# Patient Record
Sex: MALE | Race: WHITE | NOT HISPANIC OR LATINO | Employment: OTHER | URBAN - METROPOLITAN AREA
[De-identification: names, ages, dates, MRNs, and addresses within clinical notes are randomized per-mention and may not be internally consistent; named-entity substitution may affect disease eponyms.]

---

## 2018-01-01 ENCOUNTER — HOSPITAL ENCOUNTER (INPATIENT)
Facility: HOSPITAL | Age: 68
LOS: 1 days | DRG: 951 | End: 2018-12-12
Attending: FAMILY MEDICINE | Admitting: FAMILY MEDICINE
Payer: OTHER MISCELLANEOUS

## 2018-01-01 ENCOUNTER — ANESTHESIA EVENT (OUTPATIENT)
Dept: INTENSIVE CARE | Facility: HOSPITAL | Age: 68
DRG: 871 | End: 2018-01-01
Payer: MEDICARE

## 2018-01-01 ENCOUNTER — HOSPITAL ENCOUNTER (OUTPATIENT)
Dept: RADIOLOGY | Facility: OTHER | Age: 68
Discharge: HOME | End: 2018-07-12
Attending: INTERNAL MEDICINE
Payer: MEDICARE

## 2018-01-01 ENCOUNTER — HOSPITAL ENCOUNTER (EMERGENCY)
Facility: HOSPITAL | Age: 68
Discharge: HOME | End: 2018-12-01
Attending: EMERGENCY MEDICINE
Payer: MEDICARE

## 2018-01-01 ENCOUNTER — ANESTHESIA (OUTPATIENT)
Dept: INTENSIVE CARE | Facility: HOSPITAL | Age: 68
DRG: 871 | End: 2018-01-01
Payer: MEDICARE

## 2018-01-01 ENCOUNTER — HOSPITAL ENCOUNTER (INPATIENT)
Facility: HOSPITAL | Age: 68
LOS: 4 days | Discharge: HOSPICE | DRG: 871 | End: 2018-12-12
Attending: EMERGENCY MEDICINE | Admitting: FAMILY MEDICINE
Payer: MEDICARE

## 2018-01-01 VITALS
SYSTOLIC BLOOD PRESSURE: 62 MMHG | RESPIRATION RATE: 20 BRPM | OXYGEN SATURATION: 98 % | HEIGHT: 70 IN | DIASTOLIC BLOOD PRESSURE: 46 MMHG | SYSTOLIC BLOOD PRESSURE: 121 MMHG | OXYGEN SATURATION: 95 % | HEART RATE: 116 BPM | TEMPERATURE: 98 F | WEIGHT: 165 LBS | DIASTOLIC BLOOD PRESSURE: 82 MMHG | BODY MASS INDEX: 23.62 KG/M2

## 2018-01-01 VITALS
WEIGHT: 182.13 LBS | HEIGHT: 70 IN | TEMPERATURE: 103 F | RESPIRATION RATE: 22 BRPM | DIASTOLIC BLOOD PRESSURE: 29 MMHG | SYSTOLIC BLOOD PRESSURE: 65 MMHG | HEART RATE: 132 BPM | BODY MASS INDEX: 26.07 KG/M2 | OXYGEN SATURATION: 92 %

## 2018-01-01 DIAGNOSIS — R65.10 SIRS (SYSTEMIC INFLAMMATORY RESPONSE SYNDROME): ICD-10-CM

## 2018-01-01 DIAGNOSIS — M89.49 OSTEOARTHROSIS MULTIPLE SITES, NOT SPECIFIED AS GENERALIZED: ICD-10-CM

## 2018-01-01 DIAGNOSIS — I48.91 ATRIAL FIBRILLATION WITH RVR: ICD-10-CM

## 2018-01-01 DIAGNOSIS — I48.91 AFIB: ICD-10-CM

## 2018-01-01 DIAGNOSIS — R65.21 SEPTIC SHOCK: ICD-10-CM

## 2018-01-01 DIAGNOSIS — J96.01 ACUTE RESPIRATORY FAILURE WITH HYPOXIA AND HYPERCAPNIA: ICD-10-CM

## 2018-01-01 DIAGNOSIS — B17.10 ACUTE HEPATITIS C: ICD-10-CM

## 2018-01-01 DIAGNOSIS — K21.9 ESOPHAGEAL REFLUX: ICD-10-CM

## 2018-01-01 DIAGNOSIS — M54.50 LOW BACK PAIN: ICD-10-CM

## 2018-01-01 DIAGNOSIS — Z51.5 PALLIATIVE CARE ENCOUNTER: ICD-10-CM

## 2018-01-01 DIAGNOSIS — M54.31 BILATERAL SCIATICA: Primary | ICD-10-CM

## 2018-01-01 DIAGNOSIS — K21.9 ESOPHAGEAL REFLUX: Primary | ICD-10-CM

## 2018-01-01 DIAGNOSIS — M54.32 BILATERAL SCIATICA: Primary | ICD-10-CM

## 2018-01-01 DIAGNOSIS — R18.8 OTHER ASCITES: ICD-10-CM

## 2018-01-01 DIAGNOSIS — R00.0 TACHYCARDIA: ICD-10-CM

## 2018-01-01 DIAGNOSIS — R16.0 HEPATOMEGALY: ICD-10-CM

## 2018-01-01 DIAGNOSIS — J96.02 ACUTE RESPIRATORY FAILURE WITH HYPOXIA AND HYPERCAPNIA: ICD-10-CM

## 2018-01-01 DIAGNOSIS — A41.9 SEPTIC SHOCK: ICD-10-CM

## 2018-01-01 DIAGNOSIS — I10 ESSENTIAL HYPERTENSION, BENIGN: ICD-10-CM

## 2018-01-01 DIAGNOSIS — Z71.89 GOALS OF CARE, COUNSELING/DISCUSSION: ICD-10-CM

## 2018-01-01 DIAGNOSIS — J90 PLEURAL EFFUSION: ICD-10-CM

## 2018-01-01 DIAGNOSIS — R06.02 SHORTNESS OF BREATH: Primary | ICD-10-CM

## 2018-01-01 LAB
ALBUMIN FLD-MCNC: 0.5 G/DL
ALBUMIN SERPL BCP-MCNC: 1.2 G/DL
ALBUMIN SERPL BCP-MCNC: 1.4 G/DL
ALBUMIN SERPL BCP-MCNC: 1.4 G/DL
ALBUMIN SERPL BCP-MCNC: 1.5 G/DL
ALBUMIN SERPL BCP-MCNC: 1.7 G/DL
ALBUMIN SERPL BCP-MCNC: 1.8 G/DL
ALBUMIN SERPL BCP-MCNC: 1.9 G/DL
ALBUMIN SERPL BCP-MCNC: 2 G/DL
ALBUMIN SERPL BCP-MCNC: 2.2 G/DL
ALBUMIN SERPL BCP-MCNC: 2.2 G/DL
ALBUMIN SERPL BCP-MCNC: 2.3 G/DL
ALBUMIN SERPL BCP-MCNC: 2.4 G/DL
ALBUMIN SERPL BCP-MCNC: 2.5 G/DL
ALBUMIN SERPL BCP-MCNC: 2.5 G/DL
ALLENS TEST: ABNORMAL
ALP SERPL-CCNC: 121 U/L
ALP SERPL-CCNC: 128 U/L
ALP SERPL-CCNC: 132 U/L
ALP SERPL-CCNC: 141 U/L
ALP SERPL-CCNC: 153 U/L
ALP SERPL-CCNC: 172 U/L
ALT SERPL W/O P-5'-P-CCNC: 14 U/L
ALT SERPL W/O P-5'-P-CCNC: 16 U/L
ALT SERPL W/O P-5'-P-CCNC: 19 U/L
ALT SERPL W/O P-5'-P-CCNC: 19 U/L
ALT SERPL W/O P-5'-P-CCNC: 20 U/L
ALT SERPL W/O P-5'-P-CCNC: 26 U/L
AMPHET+METHAMPHET UR QL: NEGATIVE
AMYLASE, BODY FLUID: 21 U/L
ANION GAP SERPL CALC-SCNC: 10 MMOL/L
ANION GAP SERPL CALC-SCNC: 11 MMOL/L
ANION GAP SERPL CALC-SCNC: 11 MMOL/L
ANION GAP SERPL CALC-SCNC: 12 MMOL/L
ANION GAP SERPL CALC-SCNC: 12 MMOL/L
ANION GAP SERPL CALC-SCNC: 13 MMOL/L
ANION GAP SERPL CALC-SCNC: 16 MMOL/L
ANION GAP SERPL CALC-SCNC: 16 MMOL/L
ANION GAP SERPL CALC-SCNC: 18 MMOL/L
ANION GAP SERPL CALC-SCNC: 8 MMOL/L
ANION GAP SERPL CALC-SCNC: 9 MMOL/L
AORTIC ROOT ANNULUS: 3.54 CM
AORTIC VALVE CUSP SEPERATION: 1.25 CM
APTT BLDCRRT: 37.7 SEC
AST SERPL-CCNC: 52 U/L
AST SERPL-CCNC: 54 U/L
AST SERPL-CCNC: 59 U/L
AST SERPL-CCNC: 59 U/L
AST SERPL-CCNC: 62 U/L
AST SERPL-CCNC: 67 U/L
AV INDEX (PROSTH): 0.5
AV MEAN GRADIENT: 2.14 MMHG
AV PEAK GRADIENT: 4.58 MMHG
BACTERIA #/AREA URNS HPF: ABNORMAL /HPF
BACTERIA BLD CULT: NORMAL
BACTERIA UR CULT: NORMAL
BARBITURATES UR QL SCN>200 NG/ML: NEGATIVE
BASOPHILS # BLD AUTO: 0 K/UL
BASOPHILS # BLD AUTO: 0.01 K/UL
BASOPHILS # BLD AUTO: ABNORMAL K/UL
BASOPHILS NFR BLD: 0 %
BASOPHILS NFR BLD: 0.1 %
BASOPHILS NFR BLD: 1 %
BENZODIAZ UR QL SCN>200 NG/ML: NEGATIVE
BILIRUB DIRECT SERPL-MCNC: 1.2 MG/DL
BILIRUB DIRECT SERPL-MCNC: 1.3 MG/DL
BILIRUB SERPL-MCNC: 1 MG/DL
BILIRUB SERPL-MCNC: 1.1 MG/DL
BILIRUB SERPL-MCNC: 1.1 MG/DL
BILIRUB SERPL-MCNC: 1.6 MG/DL
BILIRUB UR QL STRIP: ABNORMAL
BNP SERPL-MCNC: 4248 PG/ML
BODY FLUID SOURCE AMYLASE: NORMAL
BODY FLUID SOURCE, LDH: NORMAL
BSA FOR ECHO PROCEDURE: 1.92 M2
BUN SERPL-MCNC: 11 MG/DL
BUN SERPL-MCNC: 11 MG/DL
BUN SERPL-MCNC: 13 MG/DL
BUN SERPL-MCNC: 13 MG/DL
BUN SERPL-MCNC: 14 MG/DL
BUN SERPL-MCNC: 4 MG/DL
BUN SERPL-MCNC: 4 MG/DL
BUN SERPL-MCNC: 5 MG/DL
BUN SERPL-MCNC: 6 MG/DL
BUN SERPL-MCNC: 7 MG/DL
BUN SERPL-MCNC: 9 MG/DL
BZE UR QL SCN: NEGATIVE
CALCIUM SERPL-MCNC: 6.5 MG/DL
CALCIUM SERPL-MCNC: 6.5 MG/DL
CALCIUM SERPL-MCNC: 6.7 MG/DL
CALCIUM SERPL-MCNC: 6.7 MG/DL
CALCIUM SERPL-MCNC: 6.8 MG/DL
CALCIUM SERPL-MCNC: 6.8 MG/DL
CALCIUM SERPL-MCNC: 6.9 MG/DL
CALCIUM SERPL-MCNC: 7.1 MG/DL
CALCIUM SERPL-MCNC: 7.3 MG/DL
CALCIUM SERPL-MCNC: 7.4 MG/DL
CALCIUM SERPL-MCNC: 7.4 MG/DL
CANNABINOIDS UR QL SCN: NEGATIVE
CHLORIDE SERPL-SCNC: 101 MMOL/L
CHLORIDE SERPL-SCNC: 101 MMOL/L
CHLORIDE SERPL-SCNC: 102 MMOL/L
CHLORIDE SERPL-SCNC: 103 MMOL/L
CHLORIDE SERPL-SCNC: 104 MMOL/L
CHLORIDE SERPL-SCNC: 99 MMOL/L
CHOLEST FLD-MCNC: 19 MG/DL
CK SERPL-CCNC: 90 U/L
CLARITY UR: CLEAR
CO2 SERPL-SCNC: 14 MMOL/L
CO2 SERPL-SCNC: 15 MMOL/L
CO2 SERPL-SCNC: 16 MMOL/L
CO2 SERPL-SCNC: 16 MMOL/L
CO2 SERPL-SCNC: 18 MMOL/L
CO2 SERPL-SCNC: 20 MMOL/L
CO2 SERPL-SCNC: 20 MMOL/L
CO2 SERPL-SCNC: 21 MMOL/L
CO2 SERPL-SCNC: 23 MMOL/L
COLOR UR: ABNORMAL
CREAT SERPL-MCNC: 0.6 MG/DL
CREAT SERPL-MCNC: 0.7 MG/DL
CREAT SERPL-MCNC: 0.8 MG/DL
CREAT SERPL-MCNC: 0.8 MG/DL
CREAT SERPL-MCNC: 1 MG/DL
CREAT SERPL-MCNC: 1 MG/DL
CREAT SERPL-MCNC: 1.2 MG/DL
CREAT UR-MCNC: 83 MG/DL
CV ECHO LV RWT: 0.25 CM
DELSYS: ABNORMAL
DIFFERENTIAL METHOD: ABNORMAL
DOP CALC AO PEAK VEL: 1.07 M/S
DOP CALC AO VTI: 15.01 CM
DOP CALCLVOT PEAK VEL VTI: 7.5 CM
ECHO LV POSTERIOR WALL: 0.58 CM (ref 0.6–1.1)
EOSINOPHIL # BLD AUTO: 0 K/UL
EOSINOPHIL # BLD AUTO: 0.1 K/UL
EOSINOPHIL # BLD AUTO: ABNORMAL K/UL
EOSINOPHIL NFR BLD: 0 %
EOSINOPHIL NFR BLD: 0.5 %
EP: 5
ERYTHROCYTE [DISTWIDTH] IN BLOOD BY AUTOMATED COUNT: 16.1 %
ERYTHROCYTE [DISTWIDTH] IN BLOOD BY AUTOMATED COUNT: 16.3 %
ERYTHROCYTE [DISTWIDTH] IN BLOOD BY AUTOMATED COUNT: 16.4 %
ERYTHROCYTE [DISTWIDTH] IN BLOOD BY AUTOMATED COUNT: 16.5 %
ERYTHROCYTE [DISTWIDTH] IN BLOOD BY AUTOMATED COUNT: 16.6 %
ERYTHROCYTE [SEDIMENTATION RATE] IN BLOOD BY WESTERGREN METHOD: 20 MM/H
EST. GFR  (AFRICAN AMERICAN): >60 ML/MIN/1.73 M^2
EST. GFR  (NON AFRICAN AMERICAN): >60 ML/MIN/1.73 M^2
ESTIMATED AVG GLUCOSE: 105 MG/DL
ETHANOL SERPL-MCNC: <10 MG/DL
FIO2: 0.55
FIO2: 0.7
FIO2: 100
FIO2: 30
FIO2: 40
FIO2: 40
FIO2: 60
FIO2: 70
FIO2: 75
FRACTIONAL SHORTENING: 14 % (ref 28–44)
GLUCOSE FLD-MCNC: 233 MG/DL
GLUCOSE SERPL-MCNC: 108 MG/DL
GLUCOSE SERPL-MCNC: 138 MG/DL
GLUCOSE SERPL-MCNC: 146 MG/DL
GLUCOSE SERPL-MCNC: 154 MG/DL
GLUCOSE SERPL-MCNC: 155 MG/DL
GLUCOSE SERPL-MCNC: 173 MG/DL
GLUCOSE SERPL-MCNC: 202 MG/DL
GLUCOSE SERPL-MCNC: 204 MG/DL
GLUCOSE SERPL-MCNC: 215 MG/DL
GLUCOSE SERPL-MCNC: 224 MG/DL
GLUCOSE SERPL-MCNC: 229 MG/DL
GLUCOSE SERPL-MCNC: 231 MG/DL
GLUCOSE SERPL-MCNC: 289 MG/DL
GLUCOSE UR QL STRIP: NEGATIVE
HAV IGM SERPL QL IA: NEGATIVE
HBA1C MFR BLD HPLC: 5.3 %
HBV CORE IGM SERPL QL IA: NEGATIVE
HBV SURFACE AG SERPL QL IA: NEGATIVE
HCO3 UR-SCNC: 14.4 MMOL/L (ref 24–28)
HCO3 UR-SCNC: 15 MMOL/L (ref 24–28)
HCO3 UR-SCNC: 15.2 MMOL/L (ref 24–28)
HCO3 UR-SCNC: 15.9 MMOL/L (ref 24–28)
HCO3 UR-SCNC: 16.4 MMOL/L (ref 24–28)
HCO3 UR-SCNC: 16.6 MMOL/L (ref 24–28)
HCO3 UR-SCNC: 17.2 MMOL/L (ref 24–28)
HCO3 UR-SCNC: 18.1 MMOL/L (ref 24–28)
HCO3 UR-SCNC: 19.2 MMOL/L (ref 24–28)
HCO3 UR-SCNC: 19.4 MMOL/L (ref 24–28)
HCO3 UR-SCNC: 19.5 MMOL/L (ref 24–28)
HCO3 UR-SCNC: 20.2 MMOL/L (ref 24–28)
HCO3 UR-SCNC: 20.4 MMOL/L (ref 24–28)
HCT VFR BLD AUTO: 29.9 %
HCT VFR BLD AUTO: 30.8 %
HCT VFR BLD AUTO: 31.5 %
HCT VFR BLD AUTO: 33.4 %
HCT VFR BLD AUTO: 36 %
HCV AB SERPL QL IA: POSITIVE
HGB BLD-MCNC: 10 G/DL
HGB BLD-MCNC: 10.4 G/DL
HGB BLD-MCNC: 10.9 G/DL
HGB BLD-MCNC: 11.1 G/DL
HGB BLD-MCNC: 11.7 G/DL
HGB UR QL STRIP: ABNORMAL
HYALINE CASTS #/AREA URNS LPF: 0 /LPF
INR PPP: 1.6
INR PPP: 1.9
INTERVENTRICULAR SEPTUM: 0.48 CM (ref 0.6–1.1)
IP: 10
KETONES UR QL STRIP: NEGATIVE
LA MAJOR: 6.49 CM
LA MINOR: 6.33 CM
LA WIDTH: 3.76 CM
LACTATE SERPL-SCNC: 1.7 MMOL/L
LACTATE SERPL-SCNC: 2.9 MMOL/L
LACTATE SERPL-SCNC: 3.3 MMOL/L
LACTATE SERPL-SCNC: 5.1 MMOL/L
LDH FLD L TO P-CCNC: 110 U/L
LDH SERPL L TO P-CCNC: 337 U/L
LEFT ATRIUM SIZE: 5.72 CM
LEFT ATRIUM VOLUME INDEX: 60.9 ML/M2
LEFT ATRIUM VOLUME: 117.16 CM3
LEFT INTERNAL DIMENSION IN SYSTOLE: 3.94 CM (ref 2.1–4)
LEFT VENTRICLE DIASTOLIC VOLUME INDEX: 49.81 ML/M2
LEFT VENTRICLE DIASTOLIC VOLUME: 95.78 ML
LEFT VENTRICLE MASS INDEX: 36.2 G/M2
LEFT VENTRICLE SYSTOLIC VOLUME INDEX: 35.2 ML/M2
LEFT VENTRICLE SYSTOLIC VOLUME: 67.63 ML
LEFT VENTRICULAR INTERNAL DIMENSION IN DIASTOLE: 4.57 CM (ref 3.5–6)
LEFT VENTRICULAR MASS: 69.66 G
LEUKOCYTE ESTERASE UR QL STRIP: ABNORMAL
LIPASE SERPL-CCNC: 11 U/L
LYMPHOCYTES # BLD AUTO: 0.5 K/UL
LYMPHOCYTES # BLD AUTO: 0.7 K/UL
LYMPHOCYTES # BLD AUTO: 0.8 K/UL
LYMPHOCYTES # BLD AUTO: 1 K/UL
LYMPHOCYTES # BLD AUTO: ABNORMAL K/UL
LYMPHOCYTES NFR BLD: 28 %
LYMPHOCYTES NFR BLD: 3.6 %
LYMPHOCYTES NFR BLD: 5.9 %
LYMPHOCYTES NFR BLD: 7.2 %
LYMPHOCYTES NFR BLD: 9.5 %
MAGNESIUM SERPL-MCNC: 0.7 MG/DL
MAGNESIUM SERPL-MCNC: 0.9 MG/DL
MAGNESIUM SERPL-MCNC: 1.5 MG/DL
MAGNESIUM SERPL-MCNC: 1.6 MG/DL
MAGNESIUM SERPL-MCNC: 1.8 MG/DL
MAGNESIUM SERPL-MCNC: 1.8 MG/DL
MAGNESIUM SERPL-MCNC: 2 MG/DL
MAGNESIUM SERPL-MCNC: 2.2 MG/DL
MAGNESIUM SERPL-MCNC: 2.9 MG/DL
MCH RBC QN AUTO: 34.7 PG
MCH RBC QN AUTO: 34.8 PG
MCH RBC QN AUTO: 35.1 PG
MCH RBC QN AUTO: 35.1 PG
MCH RBC QN AUTO: 35.9 PG
MCHC RBC AUTO-ENTMCNC: 32.5 G/DL
MCHC RBC AUTO-ENTMCNC: 32.6 G/DL
MCHC RBC AUTO-ENTMCNC: 33.4 G/DL
MCHC RBC AUTO-ENTMCNC: 33.8 G/DL
MCHC RBC AUTO-ENTMCNC: 35.2 G/DL
MCV RBC AUTO: 102 FL
MCV RBC AUTO: 103 FL
MCV RBC AUTO: 105 FL
MCV RBC AUTO: 106 FL
MCV RBC AUTO: 108 FL
METHADONE UR QL SCN>300 NG/ML: NEGATIVE
MICROSCOPIC COMMENT: ABNORMAL
MIN VOL: 10.2
MIN VOL: 19.5
MODE: ABNORMAL
MONOCYTES # BLD AUTO: 0.2 K/UL
MONOCYTES # BLD AUTO: 0.3 K/UL
MONOCYTES # BLD AUTO: 0.5 K/UL
MONOCYTES # BLD AUTO: 0.8 K/UL
MONOCYTES # BLD AUTO: ABNORMAL K/UL
MONOCYTES NFR BLD: 1 %
MONOCYTES NFR BLD: 2.2 %
MONOCYTES NFR BLD: 2.6 %
MONOCYTES NFR BLD: 4.5 %
MONOCYTES NFR BLD: 5.7 %
NEUTROPHILS # BLD AUTO: 10.1 K/UL
NEUTROPHILS # BLD AUTO: 12.6 K/UL
NEUTROPHILS # BLD AUTO: 8.5 K/UL
NEUTROPHILS # BLD AUTO: 9.8 K/UL
NEUTROPHILS NFR BLD: 59 %
NEUTROPHILS NFR BLD: 85.1 %
NEUTROPHILS NFR BLD: 89.6 %
NEUTROPHILS NFR BLD: 90.5 %
NEUTROPHILS NFR BLD: 91.5 %
NEUTS BAND NFR BLD MANUAL: 11 %
NITRITE UR QL STRIP: NEGATIVE
OPIATES UR QL SCN: NEGATIVE
PCO2 BLDA: 23.6 MMHG (ref 35–45)
PCO2 BLDA: 24.2 MMHG (ref 35–45)
PCO2 BLDA: 26.9 MMHG (ref 35–45)
PCO2 BLDA: 29 MMHG (ref 35–45)
PCO2 BLDA: 29.5 MMHG (ref 35–45)
PCO2 BLDA: 29.9 MMHG (ref 35–45)
PCO2 BLDA: 31.6 MMHG (ref 35–45)
PCO2 BLDA: 32.8 MMHG (ref 35–45)
PCO2 BLDA: 33.9 MMHG (ref 35–45)
PCO2 BLDA: 34 MMHG (ref 35–45)
PCO2 BLDA: 35.8 MMHG (ref 35–45)
PCO2 BLDA: 36 MMHG (ref 35–45)
PCO2 BLDA: 36.4 MMHG (ref 35–45)
PCO2 BLDA: 38 MMHG (ref 35–45)
PCO2 BLDA: 38.6 MMHG (ref 35–45)
PCP UR QL SCN>25 NG/ML: NEGATIVE
PEEP: 5
PEEP: 5
PEEP: 8
PH SMN: 7.25 [PH] (ref 7.35–7.45)
PH SMN: 7.27 [PH] (ref 7.35–7.45)
PH SMN: 7.29 [PH] (ref 7.35–7.45)
PH SMN: 7.3 [PH] (ref 7.35–7.45)
PH SMN: 7.32 [PH] (ref 7.35–7.45)
PH SMN: 7.34 [PH] (ref 7.35–7.45)
PH SMN: 7.35 [PH] (ref 7.35–7.45)
PH SMN: 7.35 [PH] (ref 7.35–7.45)
PH SMN: 7.36 [PH] (ref 7.35–7.45)
PH SMN: 7.37 [PH] (ref 7.35–7.45)
PH SMN: 7.38 [PH] (ref 7.35–7.45)
PH SMN: 7.39 [PH] (ref 7.35–7.45)
PH SMN: 7.4 [PH] (ref 7.35–7.45)
PH UR STRIP: 6 [PH] (ref 5–8)
PHOSPHATE SERPL-MCNC: 2 MG/DL
PHOSPHATE SERPL-MCNC: 2.1 MG/DL
PHOSPHATE SERPL-MCNC: 2.5 MG/DL
PHOSPHATE SERPL-MCNC: 3.2 MG/DL
PHOSPHATE SERPL-MCNC: 3.3 MG/DL
PHOSPHATE SERPL-MCNC: 3.4 MG/DL
PHOSPHATE SERPL-MCNC: 4 MG/DL
PHOSPHATE SERPL-MCNC: 4 MG/DL
PHOSPHATE SERPL-MCNC: 4.6 MG/DL
PHOSPHATE SERPL-MCNC: 5.9 MG/DL
PIP: 17
PIP: 25
PISA TR MAX VEL: 3.12 M/S
PLATELET # BLD AUTO: 103 K/UL
PLATELET # BLD AUTO: 153 K/UL
PLATELET # BLD AUTO: 214 K/UL
PLATELET # BLD AUTO: 61 K/UL
PLATELET # BLD AUTO: 82 K/UL
PLATELET BLD QL SMEAR: ABNORMAL
PMV BLD AUTO: 10.2 FL
PMV BLD AUTO: 10.2 FL
PMV BLD AUTO: 10.7 FL
PMV BLD AUTO: 11.8 FL
PMV BLD AUTO: 9.6 FL
PO2 BLDA: 111 MMHG (ref 80–100)
PO2 BLDA: 127 MMHG (ref 80–100)
PO2 BLDA: 129 MMHG (ref 80–100)
PO2 BLDA: 135 MMHG (ref 80–100)
PO2 BLDA: 142 MMHG (ref 80–100)
PO2 BLDA: 170 MMHG (ref 80–100)
PO2 BLDA: 26 MMHG (ref 80–100)
PO2 BLDA: 276 MMHG (ref 80–100)
PO2 BLDA: 36 MMHG (ref 40–60)
PO2 BLDA: 52 MMHG (ref 80–100)
PO2 BLDA: 58 MMHG (ref 80–100)
PO2 BLDA: 81 MMHG (ref 80–100)
PO2 BLDA: 82 MMHG (ref 80–100)
PO2 BLDA: 83 MMHG (ref 80–100)
PO2 BLDA: 85 MMHG (ref 80–100)
POC BE: -10 MMOL/L
POC BE: -11 MMOL/L
POC BE: -11 MMOL/L
POC BE: -5 MMOL/L
POC BE: -5 MMOL/L
POC BE: -6 MMOL/L
POC BE: -7 MMOL/L
POC BE: -7 MMOL/L
POC BE: -8 MMOL/L
POC BE: -8 MMOL/L
POC BE: -9 MMOL/L
POC BE: -9 MMOL/L
POC SATURATED O2: 100 % (ref 95–100)
POC SATURATED O2: 43 % (ref 95–100)
POC SATURATED O2: 68 % (ref 95–100)
POC SATURATED O2: 82 % (ref 95–100)
POC SATURATED O2: 86 % (ref 95–100)
POC SATURATED O2: 95 % (ref 95–100)
POC SATURATED O2: 96 % (ref 95–100)
POC SATURATED O2: 96 % (ref 95–100)
POC SATURATED O2: 97 % (ref 95–100)
POC SATURATED O2: 98 % (ref 95–100)
POC SATURATED O2: 99 % (ref 95–100)
POC TCO2: 15 MMOL/L (ref 23–27)
POC TCO2: 16 MMOL/L (ref 23–27)
POC TCO2: 16 MMOL/L (ref 23–27)
POC TCO2: 17 MMOL/L (ref 23–27)
POC TCO2: 18 MMOL/L (ref 23–27)
POC TCO2: 18 MMOL/L (ref 24–29)
POC TCO2: 19 MMOL/L (ref 23–27)
POC TCO2: 20 MMOL/L (ref 23–27)
POC TCO2: 20 MMOL/L (ref 23–27)
POC TCO2: 21 MMOL/L (ref 23–27)
POCT GLUCOSE: 105 MG/DL (ref 70–110)
POCT GLUCOSE: 125 MG/DL (ref 70–110)
POCT GLUCOSE: 142 MG/DL (ref 70–110)
POCT GLUCOSE: 152 MG/DL (ref 70–110)
POCT GLUCOSE: 163 MG/DL (ref 70–110)
POCT GLUCOSE: 170 MG/DL (ref 70–110)
POCT GLUCOSE: 176 MG/DL (ref 70–110)
POCT GLUCOSE: 180 MG/DL (ref 70–110)
POCT GLUCOSE: 182 MG/DL (ref 70–110)
POCT GLUCOSE: 190 MG/DL (ref 70–110)
POCT GLUCOSE: 239 MG/DL (ref 70–110)
POTASSIUM SERPL-SCNC: 3.3 MMOL/L
POTASSIUM SERPL-SCNC: 3.4 MMOL/L
POTASSIUM SERPL-SCNC: 3.4 MMOL/L
POTASSIUM SERPL-SCNC: 3.5 MMOL/L
POTASSIUM SERPL-SCNC: 3.7 MMOL/L
POTASSIUM SERPL-SCNC: 3.7 MMOL/L
POTASSIUM SERPL-SCNC: 4 MMOL/L
POTASSIUM SERPL-SCNC: 4.1 MMOL/L
POTASSIUM SERPL-SCNC: 4.1 MMOL/L
POTASSIUM SERPL-SCNC: 4.2 MMOL/L
POTASSIUM SERPL-SCNC: 4.8 MMOL/L
PROCALCITONIN SERPL IA-MCNC: 3.13 NG/ML
PROT FLD-MCNC: 1.2 G/DL
PROT SERPL-MCNC: 5.1 G/DL
PROT SERPL-MCNC: 5.1 G/DL
PROT SERPL-MCNC: 5.2 G/DL
PROT SERPL-MCNC: 5.3 G/DL
PROT SERPL-MCNC: 5.4 G/DL
PROT SERPL-MCNC: 6.1 G/DL
PROT UR QL STRIP: ABNORMAL
PROTHROMBIN TIME: 16.9 SEC
PROTHROMBIN TIME: 19.6 SEC
PV PEAK VELOCITY: 0.74 CM/S
RA MAJOR: 6.34 CM
RA PRESSURE: 3 MMHG
RBC # BLD AUTO: 2.85 M/UL
RBC # BLD AUTO: 2.99 M/UL
RBC # BLD AUTO: 3.09 M/UL
RBC # BLD AUTO: 3.14 M/UL
RBC # BLD AUTO: 3.33 M/UL
RBC #/AREA URNS HPF: 50 /HPF (ref 0–4)
RIGHT VENTRICULAR END-DIASTOLIC DIMENSION: 3.21 CM
SAMPLE: ABNORMAL
SITE: ABNORMAL
SODIUM SERPL-SCNC: 126 MMOL/L
SODIUM SERPL-SCNC: 129 MMOL/L
SODIUM SERPL-SCNC: 130 MMOL/L
SODIUM SERPL-SCNC: 131 MMOL/L
SODIUM SERPL-SCNC: 131 MMOL/L
SODIUM SERPL-SCNC: 132 MMOL/L
SODIUM SERPL-SCNC: 132 MMOL/L
SODIUM SERPL-SCNC: 133 MMOL/L
SODIUM SERPL-SCNC: 133 MMOL/L
SODIUM SERPL-SCNC: 134 MMOL/L
SODIUM SERPL-SCNC: 135 MMOL/L
SODIUM SERPL-SCNC: 135 MMOL/L
SODIUM SERPL-SCNC: 136 MMOL/L
SP GR UR STRIP: 1.01 (ref 1–1.03)
SP02: 100
SPECIMEN SOURCE: NORMAL
TOXICOLOGY INFORMATION: NORMAL
TR MAX PG: 38.94 MMHG
TROPONIN I SERPL DL<=0.01 NG/ML-MCNC: 0.29 NG/ML
TROPONIN I SERPL DL<=0.01 NG/ML-MCNC: 0.43 NG/ML
TROPONIN I SERPL DL<=0.01 NG/ML-MCNC: 0.53 NG/ML
TROPONIN I SERPL DL<=0.01 NG/ML-MCNC: 0.58 NG/ML
TV REST PULMONARY ARTERY PRESSURE: 41.94 MMHG
URN SPEC COLLECT METH UR: ABNORMAL
UROBILINOGEN UR STRIP-ACNC: NEGATIVE EU/DL
VT: 40
VT: 400
WBC # BLD AUTO: 10.95 K/UL
WBC # BLD AUTO: 11.05 K/UL
WBC # BLD AUTO: 13.95 K/UL
WBC # BLD AUTO: 7.18 K/UL
WBC # BLD AUTO: 9.95 K/UL
WBC #/AREA URNS HPF: >100 /HPF (ref 0–5)

## 2018-01-01 PROCEDURE — 27000221 HC OXYGEN, UP TO 24 HOURS

## 2018-01-01 PROCEDURE — 84157 ASSAY OF PROTEIN OTHER: CPT

## 2018-01-01 PROCEDURE — 85007 BL SMEAR W/DIFF WBC COUNT: CPT

## 2018-01-01 PROCEDURE — 80069 RENAL FUNCTION PANEL: CPT | Mod: 91

## 2018-01-01 PROCEDURE — 99291 PR CRITICAL CARE, E/M 30-74 MINUTES: ICD-10-PCS | Mod: ,,, | Performed by: INTERNAL MEDICINE

## 2018-01-01 PROCEDURE — 84100 ASSAY OF PHOSPHORUS: CPT

## 2018-01-01 PROCEDURE — 80048 BASIC METABOLIC PNL TOTAL CA: CPT

## 2018-01-01 PROCEDURE — 36600 WITHDRAWAL OF ARTERIAL BLOOD: CPT

## 2018-01-01 PROCEDURE — 82803 BLOOD GASES ANY COMBINATION: CPT

## 2018-01-01 PROCEDURE — 25000003 PHARM REV CODE 250: Performed by: INTERNAL MEDICINE

## 2018-01-01 PROCEDURE — 25000003 PHARM REV CODE 250: Performed by: EMERGENCY MEDICINE

## 2018-01-01 PROCEDURE — 63600175 PHARM REV CODE 636 W HCPCS: Performed by: EMERGENCY MEDICINE

## 2018-01-01 PROCEDURE — 93010 ELECTROCARDIOGRAM REPORT: CPT | Mod: 76,,, | Performed by: INTERNAL MEDICINE

## 2018-01-01 PROCEDURE — 80069 RENAL FUNCTION PANEL: CPT

## 2018-01-01 PROCEDURE — 84484 ASSAY OF TROPONIN QUANT: CPT

## 2018-01-01 PROCEDURE — 25000003 PHARM REV CODE 250

## 2018-01-01 PROCEDURE — 85730 THROMBOPLASTIN TIME PARTIAL: CPT

## 2018-01-01 PROCEDURE — 25000003 PHARM REV CODE 250: Performed by: STUDENT IN AN ORGANIZED HEALTH CARE EDUCATION/TRAINING PROGRAM

## 2018-01-01 PROCEDURE — 99900026 HC AIRWAY MAINTENANCE (STAT)

## 2018-01-01 PROCEDURE — 20000000 HC ICU ROOM

## 2018-01-01 PROCEDURE — 99900035 HC TECH TIME PER 15 MIN (STAT)

## 2018-01-01 PROCEDURE — 63600175 PHARM REV CODE 636 W HCPCS

## 2018-01-01 PROCEDURE — 93005 ELECTROCARDIOGRAM TRACING: CPT

## 2018-01-01 PROCEDURE — 99291 CRITICAL CARE FIRST HOUR: CPT | Mod: 25

## 2018-01-01 PROCEDURE — 85610 PROTHROMBIN TIME: CPT

## 2018-01-01 PROCEDURE — 80053 COMPREHEN METABOLIC PANEL: CPT

## 2018-01-01 PROCEDURE — 99291 CRITICAL CARE FIRST HOUR: CPT | Mod: ,,, | Performed by: INTERNAL MEDICINE

## 2018-01-01 PROCEDURE — 94002 VENT MGMT INPAT INIT DAY: CPT

## 2018-01-01 PROCEDURE — 63600175 PHARM REV CODE 636 W HCPCS: Performed by: STUDENT IN AN ORGANIZED HEALTH CARE EDUCATION/TRAINING PROGRAM

## 2018-01-01 PROCEDURE — 80307 DRUG TEST PRSMV CHEM ANLYZR: CPT

## 2018-01-01 PROCEDURE — 87086 URINE CULTURE/COLONY COUNT: CPT

## 2018-01-01 PROCEDURE — 90945 DIALYSIS ONE EVALUATION: CPT

## 2018-01-01 PROCEDURE — 85027 COMPLETE CBC AUTOMATED: CPT

## 2018-01-01 PROCEDURE — 83735 ASSAY OF MAGNESIUM: CPT

## 2018-01-01 PROCEDURE — 96375 TX/PRO/DX INJ NEW DRUG ADDON: CPT | Mod: 59

## 2018-01-01 PROCEDURE — 94003 VENT MGMT INPAT SUBQ DAY: CPT

## 2018-01-01 PROCEDURE — 87040 BLOOD CULTURE FOR BACTERIA: CPT

## 2018-01-01 PROCEDURE — 11000001 HC ACUTE MED/SURG PRIVATE ROOM

## 2018-01-01 PROCEDURE — 87116 MYCOBACTERIA CULTURE: CPT

## 2018-01-01 PROCEDURE — 96376 TX/PRO/DX INJ SAME DRUG ADON: CPT

## 2018-01-01 PROCEDURE — P9047 ALBUMIN (HUMAN), 25%, 50ML: HCPCS | Mod: JG | Performed by: INTERNAL MEDICINE

## 2018-01-01 PROCEDURE — 94660 CPAP INITIATION&MGMT: CPT

## 2018-01-01 PROCEDURE — 94761 N-INVAS EAR/PLS OXIMETRY MLT: CPT

## 2018-01-01 PROCEDURE — 87070 CULTURE OTHR SPECIMN AEROBIC: CPT

## 2018-01-01 PROCEDURE — 76937 US GUIDE VASCULAR ACCESS: CPT | Performed by: ANESTHESIOLOGY

## 2018-01-01 PROCEDURE — 37799 UNLISTED PX VASCULAR SURGERY: CPT

## 2018-01-01 PROCEDURE — 82550 ASSAY OF CK (CPK): CPT

## 2018-01-01 PROCEDURE — 83036 HEMOGLOBIN GLYCOSYLATED A1C: CPT

## 2018-01-01 PROCEDURE — 87102 FUNGUS ISOLATION CULTURE: CPT

## 2018-01-01 PROCEDURE — 36556 INSERT NON-TUNNEL CV CATH: CPT

## 2018-01-01 PROCEDURE — C9113 INJ PANTOPRAZOLE SODIUM, VIA: HCPCS | Performed by: STUDENT IN AN ORGANIZED HEALTH CARE EDUCATION/TRAINING PROGRAM

## 2018-01-01 PROCEDURE — 88112 CYTOPATH CELL ENHANCE TECH: CPT | Mod: 26,,, | Performed by: PATHOLOGY

## 2018-01-01 PROCEDURE — 99291 PR CRITICAL CARE, E/M 30-74 MINUTES: ICD-10-PCS | Mod: ,,, | Performed by: NURSE PRACTITIONER

## 2018-01-01 PROCEDURE — 25000003 PHARM REV CODE 250: Performed by: FAMILY MEDICINE

## 2018-01-01 PROCEDURE — 83735 ASSAY OF MAGNESIUM: CPT | Mod: 91

## 2018-01-01 PROCEDURE — 87077 CULTURE AEROBIC IDENTIFY: CPT

## 2018-01-01 PROCEDURE — 80100008 HC CRRT DAILY MAINTENANCE

## 2018-01-01 PROCEDURE — 63600175 PHARM REV CODE 636 W HCPCS: Mod: JG | Performed by: STUDENT IN AN ORGANIZED HEALTH CARE EDUCATION/TRAINING PROGRAM

## 2018-01-01 PROCEDURE — 83880 ASSAY OF NATRIURETIC PEPTIDE: CPT

## 2018-01-01 PROCEDURE — 82150 ASSAY OF AMYLASE: CPT

## 2018-01-01 PROCEDURE — 63600175 PHARM REV CODE 636 W HCPCS: Performed by: INTERNAL MEDICINE

## 2018-01-01 PROCEDURE — 88305 TISSUE EXAM BY PATHOLOGIST: CPT

## 2018-01-01 PROCEDURE — 88305 TISSUE EXAM BY PATHOLOGIST: CPT | Mod: 26,,, | Performed by: PATHOLOGY

## 2018-01-01 PROCEDURE — 93010 EKG 12-LEAD: ICD-10-PCS | Mod: 76,,, | Performed by: INTERNAL MEDICINE

## 2018-01-01 PROCEDURE — 83605 ASSAY OF LACTIC ACID: CPT

## 2018-01-01 PROCEDURE — 83605 ASSAY OF LACTIC ACID: CPT | Mod: 91

## 2018-01-01 PROCEDURE — 36415 COLL VENOUS BLD VENIPUNCTURE: CPT

## 2018-01-01 PROCEDURE — 63600175 PHARM REV CODE 636 W HCPCS: Performed by: FAMILY MEDICINE

## 2018-01-01 PROCEDURE — 25000003 PHARM REV CODE 250: Performed by: NURSE PRACTITIONER

## 2018-01-01 PROCEDURE — 99291 CRITICAL CARE FIRST HOUR: CPT | Mod: ,,, | Performed by: NURSE PRACTITIONER

## 2018-01-01 PROCEDURE — 80074 ACUTE HEPATITIS PANEL: CPT

## 2018-01-01 PROCEDURE — 87077 CULTURE AEROBIC IDENTIFY: CPT | Mod: 59

## 2018-01-01 PROCEDURE — 82042 OTHER SOURCE ALBUMIN QUAN EA: CPT

## 2018-01-01 PROCEDURE — 88305 CYTOLOGY SPECIMEN- PULMONARY MEDICAL CYTOLOGY: ICD-10-PCS | Mod: 26,,, | Performed by: PATHOLOGY

## 2018-01-01 PROCEDURE — 80076 HEPATIC FUNCTION PANEL: CPT

## 2018-01-01 PROCEDURE — 83615 LACTATE (LD) (LDH) ENZYME: CPT | Mod: 91

## 2018-01-01 PROCEDURE — 93010 EKG 12-LEAD: ICD-10-PCS | Mod: ,,, | Performed by: INTERNAL MEDICINE

## 2018-01-01 PROCEDURE — 96365 THER/PROPH/DIAG IV INF INIT: CPT

## 2018-01-01 PROCEDURE — 25000242 PHARM REV CODE 250 ALT 637 W/ HCPCS: Performed by: EMERGENCY MEDICINE

## 2018-01-01 PROCEDURE — 84311 SPECTROPHOTOMETRY: CPT

## 2018-01-01 PROCEDURE — 84145 PROCALCITONIN (PCT): CPT

## 2018-01-01 PROCEDURE — 83690 ASSAY OF LIPASE: CPT

## 2018-01-01 PROCEDURE — 96361 HYDRATE IV INFUSION ADD-ON: CPT

## 2018-01-01 PROCEDURE — 63600175 PHARM REV CODE 636 W HCPCS: Mod: JG | Performed by: INTERNAL MEDICINE

## 2018-01-01 PROCEDURE — 96366 THER/PROPH/DIAG IV INF ADDON: CPT

## 2018-01-01 PROCEDURE — 71046 X-RAY EXAM CHEST 2 VIEWS: CPT | Mod: 26,,, | Performed by: RADIOLOGY

## 2018-01-01 PROCEDURE — 27000190 HC CPAP FULL FACE MASK W/VALVE

## 2018-01-01 PROCEDURE — 84484 ASSAY OF TROPONIN QUANT: CPT | Mod: 91

## 2018-01-01 PROCEDURE — 87205 SMEAR GRAM STAIN: CPT

## 2018-01-01 PROCEDURE — 51702 INSERT TEMP BLADDER CATH: CPT

## 2018-01-01 PROCEDURE — 85025 COMPLETE CBC W/AUTO DIFF WBC: CPT

## 2018-01-01 PROCEDURE — 96372 THER/PROPH/DIAG INJ SC/IM: CPT

## 2018-01-01 PROCEDURE — 80320 DRUG SCREEN QUANTALCOHOLS: CPT

## 2018-01-01 PROCEDURE — 80053 COMPREHEN METABOLIC PANEL: CPT | Mod: 91

## 2018-01-01 PROCEDURE — 97802 MEDICAL NUTRITION INDIV IN: CPT

## 2018-01-01 PROCEDURE — 93010 ELECTROCARDIOGRAM REPORT: CPT | Mod: ,,, | Performed by: INTERNAL MEDICINE

## 2018-01-01 PROCEDURE — 87186 SC STD MICRODIL/AGAR DIL: CPT

## 2018-01-01 PROCEDURE — 99284 EMERGENCY DEPT VISIT MOD MDM: CPT | Mod: 25

## 2018-01-01 PROCEDURE — 27100108

## 2018-01-01 PROCEDURE — 87088 URINE BACTERIA CULTURE: CPT

## 2018-01-01 PROCEDURE — 84100 ASSAY OF PHOSPHORUS: CPT | Mod: 91

## 2018-01-01 PROCEDURE — P9047 ALBUMIN (HUMAN), 25%, 50ML: HCPCS | Mod: JG | Performed by: STUDENT IN AN ORGANIZED HEALTH CARE EDUCATION/TRAINING PROGRAM

## 2018-01-01 PROCEDURE — 25500020 PHARM REV CODE 255: Performed by: EMERGENCY MEDICINE

## 2018-01-01 PROCEDURE — 83615 LACTATE (LD) (LDH) ENZYME: CPT

## 2018-01-01 PROCEDURE — 88112 CYTOLOGY SPECIMEN- PULMONARY MEDICAL CYTOLOGY: ICD-10-PCS | Mod: 26,,, | Performed by: PATHOLOGY

## 2018-01-01 PROCEDURE — 94640 AIRWAY INHALATION TREATMENT: CPT

## 2018-01-01 PROCEDURE — 87206 SMEAR FLUORESCENT/ACID STAI: CPT

## 2018-01-01 PROCEDURE — 82945 GLUCOSE OTHER FLUID: CPT

## 2018-01-01 PROCEDURE — 71046 X-RAY EXAM CHEST 2 VIEWS: CPT | Mod: TC,FY

## 2018-01-01 PROCEDURE — 81000 URINALYSIS NONAUTO W/SCOPE: CPT | Mod: 59

## 2018-01-01 PROCEDURE — 73030 X-RAY EXAM OF SHOULDER: CPT | Mod: TC,FY,LT

## 2018-01-01 PROCEDURE — 73030 X-RAY EXAM OF SHOULDER: CPT | Mod: 26,LT,, | Performed by: RADIOLOGY

## 2018-01-01 RX ORDER — IBUPROFEN 200 MG
24 TABLET ORAL
Status: CANCELLED | OUTPATIENT
Start: 2018-01-01

## 2018-01-01 RX ORDER — ALBUMIN HUMAN 250 G/1000ML
12.5 SOLUTION INTRAVENOUS ONCE
Status: COMPLETED | OUTPATIENT
Start: 2018-01-01 | End: 2018-01-01

## 2018-01-01 RX ORDER — FENTANYL CITRATE 50 UG/ML
50 INJECTION, SOLUTION INTRAMUSCULAR; INTRAVENOUS
Status: DISCONTINUED | OUTPATIENT
Start: 2018-12-14 | End: 2018-01-01 | Stop reason: HOSPADM

## 2018-01-01 RX ORDER — FUROSEMIDE 10 MG/ML
40 INJECTION INTRAMUSCULAR; INTRAVENOUS ONCE
Status: COMPLETED | OUTPATIENT
Start: 2018-01-01 | End: 2018-01-01

## 2018-01-01 RX ORDER — DEXMEDETOMIDINE HYDROCHLORIDE 4 UG/ML
0.2 INJECTION, SOLUTION INTRAVENOUS CONTINUOUS
Status: DISCONTINUED | OUTPATIENT
Start: 2018-01-01 | End: 2018-01-01 | Stop reason: HOSPADM

## 2018-01-01 RX ORDER — FENTANYL CITRATE 50 UG/ML
50 INJECTION, SOLUTION INTRAMUSCULAR; INTRAVENOUS
Status: DISCONTINUED | OUTPATIENT
Start: 2018-01-01 | End: 2018-01-01 | Stop reason: HOSPADM

## 2018-01-01 RX ORDER — PROPOFOL 10 MG/ML
INJECTION, EMULSION INTRAVENOUS
Status: COMPLETED
Start: 2018-01-01 | End: 2018-01-01

## 2018-01-01 RX ORDER — MAGNESIUM SULFATE HEPTAHYDRATE 40 MG/ML
2 INJECTION, SOLUTION INTRAVENOUS
Status: DISCONTINUED | OUTPATIENT
Start: 2018-01-01 | End: 2018-01-01

## 2018-01-01 RX ORDER — FOLIC ACID 1 MG/1
1 TABLET ORAL DAILY
Status: DISCONTINUED | OUTPATIENT
Start: 2018-01-01 | End: 2018-01-01 | Stop reason: HOSPADM

## 2018-01-01 RX ORDER — SODIUM CHLORIDE 9 MG/ML
INJECTION, SOLUTION INTRAVENOUS ONCE
Status: COMPLETED | OUTPATIENT
Start: 2018-01-01 | End: 2018-01-01

## 2018-01-01 RX ORDER — OXYCODONE AND ACETAMINOPHEN 5; 325 MG/1; MG/1
1 TABLET ORAL
Status: COMPLETED | OUTPATIENT
Start: 2018-01-01 | End: 2018-01-01

## 2018-01-01 RX ORDER — ETOMIDATE 2 MG/ML
INJECTION INTRAVENOUS
Status: COMPLETED
Start: 2018-01-01 | End: 2018-01-01

## 2018-01-01 RX ORDER — PROPOFOL 10 MG/ML
5 INJECTION, EMULSION INTRAVENOUS CONTINUOUS
Status: DISCONTINUED | OUTPATIENT
Start: 2018-01-01 | End: 2018-01-01

## 2018-01-01 RX ORDER — THIAMINE HCL 100 MG
100 TABLET ORAL DAILY
Status: DISCONTINUED | OUTPATIENT
Start: 2018-01-01 | End: 2018-01-01 | Stop reason: HOSPADM

## 2018-01-01 RX ORDER — MAGNESIUM SULFATE HEPTAHYDRATE 40 MG/ML
2 INJECTION, SOLUTION INTRAVENOUS ONCE
Status: COMPLETED | OUTPATIENT
Start: 2018-01-01 | End: 2018-01-01

## 2018-01-01 RX ORDER — NOREPINEPHRINE BITARTRATE/D5W 16MG/250ML
0.02 PLASTIC BAG, INJECTION (ML) INTRAVENOUS CONTINUOUS
Status: DISCONTINUED | OUTPATIENT
Start: 2018-01-01 | End: 2018-01-01

## 2018-01-01 RX ORDER — DILTIAZEM HYDROCHLORIDE 5 MG/ML
0.25 INJECTION INTRAVENOUS
Status: COMPLETED | OUTPATIENT
Start: 2018-01-01 | End: 2018-01-01

## 2018-01-01 RX ORDER — VANCOMYCIN HCL IN 5 % DEXTROSE 1G/250ML
15 PLASTIC BAG, INJECTION (ML) INTRAVENOUS
Status: DISCONTINUED | OUTPATIENT
Start: 2018-01-01 | End: 2018-01-01 | Stop reason: DRUGHIGH

## 2018-01-01 RX ORDER — TRAMADOL HYDROCHLORIDE 50 MG/1
50 TABLET ORAL EVERY 6 HOURS PRN
Qty: 12 TABLET | Refills: 0 | Status: SHIPPED | OUTPATIENT
Start: 2018-01-01 | End: 2018-01-01 | Stop reason: HOSPADM

## 2018-01-01 RX ORDER — IPRATROPIUM BROMIDE AND ALBUTEROL SULFATE 2.5; .5 MG/3ML; MG/3ML
3 SOLUTION RESPIRATORY (INHALATION)
Status: COMPLETED | OUTPATIENT
Start: 2018-01-01 | End: 2018-01-01

## 2018-01-01 RX ORDER — IBUPROFEN 200 MG
16 TABLET ORAL
Status: CANCELLED | OUTPATIENT
Start: 2018-01-01

## 2018-01-01 RX ORDER — POTASSIUM CHLORIDE 29.8 MG/ML
40 INJECTION INTRAVENOUS ONCE
Status: COMPLETED | OUTPATIENT
Start: 2018-01-01 | End: 2018-01-01

## 2018-01-01 RX ORDER — LORAZEPAM 2 MG/ML
INJECTION INTRAMUSCULAR
Status: DISCONTINUED
Start: 2018-01-01 | End: 2018-01-01 | Stop reason: HOSPADM

## 2018-01-01 RX ORDER — FENTANYL CITRAT/DEXTROSE 5%/PF 100 MCG/10
25 PATIENT CONTROLLED ANALGESIA SYRINGE INTRAVENOUS CONTINUOUS
Status: DISCONTINUED | OUTPATIENT
Start: 2018-01-01 | End: 2018-01-01

## 2018-01-01 RX ORDER — MAGNESIUM SULFATE HEPTAHYDRATE 40 MG/ML
2 INJECTION, SOLUTION INTRAVENOUS
Status: DISCONTINUED | OUTPATIENT
Start: 2018-01-01 | End: 2018-01-01 | Stop reason: HOSPADM

## 2018-01-01 RX ORDER — HYDROCODONE BITARTRATE AND ACETAMINOPHEN 500; 5 MG/1; MG/1
TABLET ORAL CONTINUOUS
Status: DISPENSED | OUTPATIENT
Start: 2018-01-01 | End: 2018-01-01

## 2018-01-01 RX ORDER — ENOXAPARIN SODIUM 100 MG/ML
40 INJECTION SUBCUTANEOUS EVERY 24 HOURS
Status: DISCONTINUED | OUTPATIENT
Start: 2018-01-01 | End: 2018-01-01 | Stop reason: HOSPADM

## 2018-01-01 RX ORDER — FENTANYL CITRATE-0.9 % NACL/PF 10 MCG/ML
PLASTIC BAG, INJECTION (ML) INTRAVENOUS CONTINUOUS
Status: DISCONTINUED | OUTPATIENT
Start: 2018-01-01 | End: 2018-01-01

## 2018-01-01 RX ORDER — DEXAMETHASONE SODIUM PHOSPHATE 4 MG/ML
8 INJECTION, SOLUTION INTRA-ARTICULAR; INTRALESIONAL; INTRAMUSCULAR; INTRAVENOUS; SOFT TISSUE
Status: COMPLETED | OUTPATIENT
Start: 2018-01-01 | End: 2018-01-01

## 2018-01-01 RX ORDER — ALBUMIN HUMAN 250 G/1000ML
25 SOLUTION INTRAVENOUS ONCE
Status: COMPLETED | OUTPATIENT
Start: 2018-01-01 | End: 2018-01-01

## 2018-01-01 RX ORDER — GLUCAGON 1 MG
1 KIT INJECTION
Status: CANCELLED | OUTPATIENT
Start: 2018-01-01

## 2018-01-01 RX ORDER — FUROSEMIDE 10 MG/ML
80 INJECTION INTRAMUSCULAR; INTRAVENOUS
Status: COMPLETED | OUTPATIENT
Start: 2018-01-01 | End: 2018-01-01

## 2018-01-01 RX ORDER — ACETAMINOPHEN 10 MG/ML
1000 INJECTION, SOLUTION INTRAVENOUS ONCE
Status: COMPLETED | OUTPATIENT
Start: 2018-01-01 | End: 2018-01-01

## 2018-01-01 RX ORDER — PANTOPRAZOLE SODIUM 40 MG/10ML
40 INJECTION, POWDER, LYOPHILIZED, FOR SOLUTION INTRAVENOUS DAILY
Status: DISCONTINUED | OUTPATIENT
Start: 2018-01-01 | End: 2018-01-01 | Stop reason: HOSPADM

## 2018-01-01 RX ORDER — MIDAZOLAM HYDROCHLORIDE 1 MG/ML
2 INJECTION INTRAMUSCULAR; INTRAVENOUS ONCE
Status: COMPLETED | OUTPATIENT
Start: 2018-01-01 | End: 2018-01-01

## 2018-01-01 RX ORDER — SODIUM CHLORIDE 9 MG/ML
INJECTION, SOLUTION INTRAVENOUS CONTINUOUS
Status: DISCONTINUED | OUTPATIENT
Start: 2018-01-01 | End: 2018-01-01

## 2018-01-01 RX ORDER — SODIUM BICARBONATE 1 MEQ/ML
50 SYRINGE (ML) INTRAVENOUS ONCE
Status: COMPLETED | OUTPATIENT
Start: 2018-01-01 | End: 2018-01-01

## 2018-01-01 RX ORDER — HEPARIN SODIUM 1000 [USP'U]/ML
2500 INJECTION, SOLUTION INTRAVENOUS; SUBCUTANEOUS ONCE
Status: DISCONTINUED | OUTPATIENT
Start: 2018-01-01 | End: 2018-01-01

## 2018-01-01 RX ORDER — SODIUM,POTASSIUM PHOSPHATES 280-250MG
2 POWDER IN PACKET (EA) ORAL ONCE
Status: COMPLETED | OUTPATIENT
Start: 2018-01-01 | End: 2018-01-01

## 2018-01-01 RX ORDER — SODIUM BICARBONATE 1 MEQ/ML
SYRINGE (ML) INTRAVENOUS
Status: COMPLETED
Start: 2018-01-01 | End: 2018-01-01

## 2018-01-01 RX ORDER — INSULIN ASPART 100 [IU]/ML
1-10 INJECTION, SOLUTION INTRAVENOUS; SUBCUTANEOUS EVERY 6 HOURS PRN
Status: DISCONTINUED | OUTPATIENT
Start: 2018-01-01 | End: 2018-01-01 | Stop reason: HOSPADM

## 2018-01-01 RX ORDER — ROCURONIUM BROMIDE 10 MG/ML
INJECTION, SOLUTION INTRAVENOUS
Status: DISCONTINUED
Start: 2018-01-01 | End: 2018-01-01 | Stop reason: WASHOUT

## 2018-01-01 RX ORDER — MAGNESIUM SULFATE HEPTAHYDRATE 40 MG/ML
2 INJECTION, SOLUTION INTRAVENOUS
Status: COMPLETED | OUTPATIENT
Start: 2018-01-01 | End: 2018-01-01

## 2018-01-01 RX ORDER — HEPARIN SODIUM 1000 [USP'U]/ML
2500 INJECTION, SOLUTION INTRAVENOUS; SUBCUTANEOUS
Status: DISCONTINUED | OUTPATIENT
Start: 2018-01-01 | End: 2018-01-01 | Stop reason: HOSPADM

## 2018-01-01 RX ORDER — FENTANYL CITRATE-0.9 % NACL/PF 10 MCG/ML
PLASTIC BAG, INJECTION (ML) INTRAVENOUS CONTINUOUS
Status: DISCONTINUED | OUTPATIENT
Start: 2018-01-01 | End: 2018-01-01 | Stop reason: HOSPADM

## 2018-01-01 RX ORDER — SUCCINYLCHOLINE CHLORIDE 20 MG/ML
INJECTION INTRAMUSCULAR; INTRAVENOUS
Status: COMPLETED
Start: 2018-01-01 | End: 2018-01-01

## 2018-01-01 RX ORDER — FENTANYL CITRATE-0.9 % NACL/PF 10 MCG/ML
25 PLASTIC BAG, INJECTION (ML) INTRAVENOUS CONTINUOUS
Status: DISCONTINUED | OUTPATIENT
Start: 2018-01-01 | End: 2018-01-01 | Stop reason: HOSPADM

## 2018-01-01 RX ORDER — GLUCAGON 1 MG
1 KIT INJECTION
Status: DISCONTINUED | OUTPATIENT
Start: 2018-01-01 | End: 2018-01-01 | Stop reason: HOSPADM

## 2018-01-01 RX ORDER — MAGNESIUM SULFATE 1 G/100ML
1 INJECTION INTRAVENOUS ONCE
Status: COMPLETED | OUTPATIENT
Start: 2018-01-01 | End: 2018-01-01

## 2018-01-01 RX ORDER — INDOMETHACIN 25 MG/1
50 CAPSULE ORAL ONCE
Status: COMPLETED | OUTPATIENT
Start: 2018-01-01 | End: 2018-01-01

## 2018-01-01 RX ORDER — MAGNESIUM SULFATE HEPTAHYDRATE 40 MG/ML
2 INJECTION, SOLUTION INTRAVENOUS
Status: DISPENSED | OUTPATIENT
Start: 2018-01-01 | End: 2018-01-01

## 2018-01-01 RX ORDER — PROPOFOL 10 MG/ML
5 INJECTION, EMULSION INTRAVENOUS DAILY
Status: DISCONTINUED | OUTPATIENT
Start: 2018-01-01 | End: 2018-01-01

## 2018-01-01 RX ORDER — MIDAZOLAM HYDROCHLORIDE 1 MG/ML
INJECTION INTRAMUSCULAR; INTRAVENOUS
Status: COMPLETED
Start: 2018-01-01 | End: 2018-01-01

## 2018-01-01 RX ORDER — POTASSIUM CHLORIDE 7.45 MG/ML
10 INJECTION INTRAVENOUS
Status: COMPLETED | OUTPATIENT
Start: 2018-01-01 | End: 2018-01-01

## 2018-01-01 RX ORDER — DILTIAZEM HYDROCHLORIDE 5 MG/ML
20 INJECTION INTRAVENOUS
Status: COMPLETED | OUTPATIENT
Start: 2018-01-01 | End: 2018-01-01

## 2018-01-01 RX ORDER — SODIUM CHLORIDE 0.9 % (FLUSH) 0.9 %
5 SYRINGE (ML) INJECTION
Status: CANCELLED | OUTPATIENT
Start: 2018-01-01

## 2018-01-01 RX ORDER — CEFEPIME HYDROCHLORIDE 2 G/50ML
2 INJECTION, SOLUTION INTRAVENOUS
Status: DISCONTINUED | OUTPATIENT
Start: 2018-01-01 | End: 2018-01-01 | Stop reason: HOSPADM

## 2018-01-01 RX ORDER — HYDROCODONE BITARTRATE AND ACETAMINOPHEN 5; 325 MG/1; MG/1
1 TABLET ORAL
Status: COMPLETED | OUTPATIENT
Start: 2018-01-01 | End: 2018-01-01

## 2018-01-01 RX ADMIN — DEXMEDETOMIDINE HYDROCHLORIDE 1.4 MCG/KG/HR: 4 INJECTION, SOLUTION INTRAVENOUS at 08:12

## 2018-01-01 RX ADMIN — Medication 50 MEQ: at 08:12

## 2018-01-01 RX ADMIN — HYDROCODONE BITARTRATE AND ACETAMINOPHEN 1 TABLET: 5; 325 TABLET ORAL at 01:12

## 2018-01-01 RX ADMIN — DILTIAZEM HYDROCHLORIDE 20 MG: 5 INJECTION INTRAVENOUS at 05:12

## 2018-01-01 RX ADMIN — POTASSIUM CHLORIDE 10 MEQ: 7.46 INJECTION, SOLUTION INTRAVENOUS at 09:12

## 2018-01-01 RX ADMIN — DEXMEDETOMIDINE HYDROCHLORIDE 1.4 MCG/KG/HR: 4 INJECTION, SOLUTION INTRAVENOUS at 06:12

## 2018-01-01 RX ADMIN — CALCIUM GLUCONATE 1000 MG: 98 INJECTION, SOLUTION INTRAVENOUS at 05:12

## 2018-01-01 RX ADMIN — ENOXAPARIN SODIUM 40 MG: 100 INJECTION SUBCUTANEOUS at 04:12

## 2018-01-01 RX ADMIN — CEFEPIME HYDROCHLORIDE 2 G: 2 INJECTION, SOLUTION INTRAVENOUS at 12:12

## 2018-01-01 RX ADMIN — PROPOFOL 5 MCG/KG/MIN: 10 INJECTION, EMULSION INTRAVENOUS at 12:12

## 2018-01-01 RX ADMIN — AMIODARONE HYDROCHLORIDE 0.5 MG/MIN: 1.8 INJECTION, SOLUTION INTRAVENOUS at 08:12

## 2018-01-01 RX ADMIN — SUCCINYLCHOLINE CHLORIDE 200 MG: 20 INJECTION, SOLUTION INTRAMUSCULAR; INTRAVENOUS; PARENTERAL at 12:12

## 2018-01-01 RX ADMIN — ALBUMIN HUMAN 25 G: 0.25 SOLUTION INTRAVENOUS at 02:12

## 2018-01-01 RX ADMIN — SODIUM BICARBONATE: 84 INJECTION, SOLUTION INTRAVENOUS at 09:12

## 2018-01-01 RX ADMIN — POTASSIUM & SODIUM PHOSPHATES POWDER PACK 280-160-250 MG 2 PACKET: 280-160-250 PACK at 07:12

## 2018-01-01 RX ADMIN — FUROSEMIDE 5 MG/HR: 10 INJECTION, SOLUTION INTRAMUSCULAR; INTRAVENOUS at 01:12

## 2018-01-01 RX ADMIN — AMIODARONE HYDROCHLORIDE 0.5 MG/MIN: 1.8 INJECTION, SOLUTION INTRAVENOUS at 05:12

## 2018-01-01 RX ADMIN — LORAZEPAM 2 MG: 2 INJECTION INTRAMUSCULAR; INTRAVENOUS at 01:12

## 2018-01-01 RX ADMIN — DEXAMETHASONE SODIUM PHOSPHATE 8 MG: 4 INJECTION, SOLUTION INTRAMUSCULAR; INTRAVENOUS at 01:12

## 2018-01-01 RX ADMIN — LORAZEPAM 1 MG: 2 INJECTION INTRAMUSCULAR; INTRAVENOUS at 05:12

## 2018-01-01 RX ADMIN — MAGNESIUM SULFATE IN WATER 2 G: 40 INJECTION, SOLUTION INTRAVENOUS at 08:12

## 2018-01-01 RX ADMIN — IPRATROPIUM BROMIDE AND ALBUTEROL SULFATE 3 ML: .5; 3 SOLUTION RESPIRATORY (INHALATION) at 05:12

## 2018-01-01 RX ADMIN — Medication 0.02 MCG/KG/MIN: at 08:12

## 2018-01-01 RX ADMIN — PANTOPRAZOLE SODIUM 40 MG: 40 INJECTION, POWDER, FOR SOLUTION INTRAVENOUS at 08:12

## 2018-01-01 RX ADMIN — AMIODARONE HYDROCHLORIDE 150 MG: 1.5 INJECTION, SOLUTION INTRAVENOUS at 09:12

## 2018-01-01 RX ADMIN — FUROSEMIDE 40 MG: 10 INJECTION, SOLUTION INTRAMUSCULAR; INTRAVENOUS at 12:12

## 2018-01-01 RX ADMIN — DEXMEDETOMIDINE HYDROCHLORIDE 0.2 MCG/KG/HR: 4 INJECTION, SOLUTION INTRAVENOUS at 01:12

## 2018-01-01 RX ADMIN — DEXMEDETOMIDINE HYDROCHLORIDE 1 MCG/KG/HR: 4 INJECTION, SOLUTION INTRAVENOUS at 10:12

## 2018-01-01 RX ADMIN — CEFEPIME HYDROCHLORIDE 2 G: 2 INJECTION, SOLUTION INTRAVENOUS at 01:12

## 2018-01-01 RX ADMIN — CEFEPIME HYDROCHLORIDE 2 G: 2 INJECTION, SOLUTION INTRAVENOUS at 02:12

## 2018-01-01 RX ADMIN — INSULIN ASPART 2 UNITS: 100 INJECTION, SOLUTION INTRAVENOUS; SUBCUTANEOUS at 06:12

## 2018-01-01 RX ADMIN — POTASSIUM PHOSPHATE, MONOBASIC AND POTASSIUM PHOSPHATE, DIBASIC 20 MMOL: 224; 236 INJECTION, SOLUTION INTRAVENOUS at 07:12

## 2018-01-01 RX ADMIN — FOLIC ACID 1 MG: 1 TABLET ORAL at 08:12

## 2018-01-01 RX ADMIN — Medication 0.7 MCG/KG/MIN: at 09:12

## 2018-01-01 RX ADMIN — CALCIUM GLUCONATE 1 G: 98 INJECTION, SOLUTION INTRAVENOUS at 11:12

## 2018-01-01 RX ADMIN — IOHEXOL 100 ML: 350 INJECTION, SOLUTION INTRAVENOUS at 08:12

## 2018-01-01 RX ADMIN — AMIODARONE HYDROCHLORIDE 0.5 MG/MIN: 1.8 INJECTION, SOLUTION INTRAVENOUS at 06:12

## 2018-01-01 RX ADMIN — DEXMEDETOMIDINE HYDROCHLORIDE 1.2 MCG/KG/HR: 4 INJECTION, SOLUTION INTRAVENOUS at 05:12

## 2018-01-01 RX ADMIN — INSULIN ASPART 2 UNITS: 100 INJECTION, SOLUTION INTRAVENOUS; SUBCUTANEOUS at 12:12

## 2018-01-01 RX ADMIN — DILTIAZEM HYDROCHLORIDE 18.5 MG: 5 INJECTION INTRAVENOUS at 07:12

## 2018-01-01 RX ADMIN — THERA TABS 1 TABLET: TAB at 02:12

## 2018-01-01 RX ADMIN — Medication 0.65 MCG/KG/MIN: at 05:12

## 2018-01-01 RX ADMIN — CALCIUM GLUCONATE 1000 MG: 98 INJECTION, SOLUTION INTRAVENOUS at 08:12

## 2018-01-01 RX ADMIN — ACETAMINOPHEN 1000 MG: 10 INJECTION, SOLUTION INTRAVENOUS at 08:12

## 2018-01-01 RX ADMIN — INSULIN ASPART 4 UNITS: 100 INJECTION, SOLUTION INTRAVENOUS; SUBCUTANEOUS at 06:12

## 2018-01-01 RX ADMIN — AMIODARONE HYDROCHLORIDE 0.5 MG/MIN: 1.8 INJECTION, SOLUTION INTRAVENOUS at 03:12

## 2018-01-01 RX ADMIN — DEXMEDETOMIDINE HYDROCHLORIDE 0.7 MCG/KG/HR: 4 INJECTION, SOLUTION INTRAVENOUS at 05:12

## 2018-01-01 RX ADMIN — FOLIC ACID 1 MG: 1 TABLET ORAL at 02:12

## 2018-01-01 RX ADMIN — SODIUM BICARBONATE 50 MEQ: 84 INJECTION, SOLUTION INTRAVENOUS at 09:12

## 2018-01-01 RX ADMIN — ALBUMIN HUMAN 12.5 G: 0.25 SOLUTION INTRAVENOUS at 09:12

## 2018-01-01 RX ADMIN — THERA TABS 1 TABLET: TAB at 08:12

## 2018-01-01 RX ADMIN — Medication 2 MCG/KG/MIN: at 04:12

## 2018-01-01 RX ADMIN — MAGNESIUM SULFATE IN WATER 2 G: 40 INJECTION, SOLUTION INTRAVENOUS at 02:12

## 2018-01-01 RX ADMIN — MAGNESIUM SULFATE HEPTAHYDRATE 3 G: 500 INJECTION, SOLUTION INTRAMUSCULAR; INTRAVENOUS at 05:12

## 2018-01-01 RX ADMIN — DEXMEDETOMIDINE HYDROCHLORIDE 1.4 MCG/KG/HR: 4 INJECTION, SOLUTION INTRAVENOUS at 03:12

## 2018-01-01 RX ADMIN — Medication 100 MG: at 02:12

## 2018-01-01 RX ADMIN — POTASSIUM CHLORIDE 10 MEQ: 7.46 INJECTION, SOLUTION INTRAVENOUS at 07:12

## 2018-01-01 RX ADMIN — MAGNESIUM SULFATE IN WATER 2 G: 40 INJECTION, SOLUTION INTRAVENOUS at 05:12

## 2018-01-01 RX ADMIN — MAGNESIUM SULFATE IN WATER 2 G: 40 INJECTION, SOLUTION INTRAVENOUS at 12:12

## 2018-01-01 RX ADMIN — POTASSIUM CHLORIDE 10 MEQ: 7.46 INJECTION, SOLUTION INTRAVENOUS at 10:12

## 2018-01-01 RX ADMIN — IPRATROPIUM BROMIDE AND ALBUTEROL SULFATE 3 ML: .5; 3 SOLUTION RESPIRATORY (INHALATION) at 07:12

## 2018-01-01 RX ADMIN — POTASSIUM CHLORIDE 40 MEQ: 400 INJECTION, SOLUTION INTRAVENOUS at 12:12

## 2018-01-01 RX ADMIN — ENOXAPARIN SODIUM 40 MG: 100 INJECTION SUBCUTANEOUS at 12:12

## 2018-01-01 RX ADMIN — PROPOFOL 50 MCG/KG/MIN: 10 INJECTION, EMULSION INTRAVENOUS at 10:12

## 2018-01-01 RX ADMIN — SODIUM CHLORIDE: 0.9 INJECTION, SOLUTION INTRAVENOUS at 10:12

## 2018-01-01 RX ADMIN — Medication 50 MCG/HR: at 08:12

## 2018-01-01 RX ADMIN — SODIUM CHLORIDE 1000 ML: 0.9 INJECTION, SOLUTION INTRAVENOUS at 07:12

## 2018-01-01 RX ADMIN — Medication 1.5 MCG/KG/MIN: at 02:12

## 2018-01-01 RX ADMIN — MIDAZOLAM HYDROCHLORIDE 2 MG: 1 INJECTION INTRAMUSCULAR; INTRAVENOUS at 01:12

## 2018-01-01 RX ADMIN — SODIUM CHLORIDE: 0.9 INJECTION, SOLUTION INTRAVENOUS at 06:12

## 2018-01-01 RX ADMIN — MIDAZOLAM HYDROCHLORIDE 2 MG: 1 INJECTION, SOLUTION INTRAMUSCULAR; INTRAVENOUS at 01:12

## 2018-01-01 RX ADMIN — PROPOFOL 50 MCG/KG/MIN: 10 INJECTION, EMULSION INTRAVENOUS at 07:12

## 2018-01-01 RX ADMIN — INSULIN ASPART 2 UNITS: 100 INJECTION, SOLUTION INTRAVENOUS; SUBCUTANEOUS at 04:12

## 2018-01-01 RX ADMIN — Medication 0.25 MCG/KG/MIN: at 05:12

## 2018-01-01 RX ADMIN — LORAZEPAM 2 MG: 2 INJECTION INTRAMUSCULAR; INTRAVENOUS at 03:12

## 2018-01-01 RX ADMIN — FUROSEMIDE 80 MG: 10 INJECTION, SOLUTION INTRAVENOUS at 05:12

## 2018-01-01 RX ADMIN — SODIUM PHOSPHATE, MONOBASIC, MONOHYDRATE 9.99 MMOL: 276; 142 INJECTION, SOLUTION INTRAVENOUS at 06:12

## 2018-01-01 RX ADMIN — LORAZEPAM 2 MG: 2 INJECTION INTRAMUSCULAR; INTRAVENOUS at 04:12

## 2018-01-01 RX ADMIN — ETOMIDATE 10 MG: 40 INJECTION, SOLUTION INTRAVENOUS at 12:12

## 2018-01-01 RX ADMIN — Medication 100 MG: at 08:12

## 2018-01-01 RX ADMIN — NOREPINEPHRINE BITARTRATE 3 MCG/KG/MIN: 1 INJECTION, SOLUTION, CONCENTRATE INTRAVENOUS at 04:12

## 2018-01-01 RX ADMIN — SODIUM PHOSPHATE, MONOBASIC, MONOHYDRATE 15 MMOL: 276; 142 INJECTION, SOLUTION INTRAVENOUS at 10:12

## 2018-01-01 RX ADMIN — Medication 50 MCG/HR: at 01:12

## 2018-01-01 RX ADMIN — NOREPINEPHRINE BITARTRATE 3 MCG/KG/MIN: 1 INJECTION, SOLUTION, CONCENTRATE INTRAVENOUS at 02:12

## 2018-01-01 RX ADMIN — PROPOFOL 50 MCG/KG/MIN: 10 INJECTION, EMULSION INTRAVENOUS at 05:12

## 2018-01-01 RX ADMIN — HEPARIN SODIUM 2500 UNITS: 1000 INJECTION, SOLUTION INTRAVENOUS; SUBCUTANEOUS at 10:12

## 2018-01-01 RX ADMIN — VANCOMYCIN HYDROCHLORIDE 2250 MG: 1 INJECTION, POWDER, LYOPHILIZED, FOR SOLUTION INTRAVENOUS at 11:12

## 2018-01-01 RX ADMIN — CALCIUM GLUCONATE 1000 MG: 98 INJECTION, SOLUTION INTRAVENOUS at 02:12

## 2018-01-01 RX ADMIN — DEXMEDETOMIDINE HYDROCHLORIDE 1.4 MCG/KG/HR: 4 INJECTION, SOLUTION INTRAVENOUS at 12:12

## 2018-01-01 RX ADMIN — SODIUM PHOSPHATE, MONOBASIC, MONOHYDRATE 39.99 MMOL: 276; 142 INJECTION, SOLUTION INTRAVENOUS at 04:12

## 2018-01-01 RX ADMIN — Medication 25 MCG/HR: at 10:12

## 2018-01-01 RX ADMIN — LORAZEPAM 1 MG: 2 INJECTION INTRAMUSCULAR; INTRAVENOUS at 06:12

## 2018-01-01 RX ADMIN — NOREPINEPHRINE BITARTRATE 3 MCG/KG/MIN: 1 INJECTION, SOLUTION, CONCENTRATE INTRAVENOUS at 11:12

## 2018-01-01 RX ADMIN — CALCIUM GLUCONATE 2000 MG: 94 INJECTION, SOLUTION INTRAVENOUS at 05:12

## 2018-01-01 RX ADMIN — DEXMEDETOMIDINE HYDROCHLORIDE 1.2 MCG/KG/HR: 4 INJECTION, SOLUTION INTRAVENOUS at 01:12

## 2018-01-01 RX ADMIN — DEXMEDETOMIDINE HYDROCHLORIDE 1.4 MCG/KG/HR: 4 INJECTION, SOLUTION INTRAVENOUS at 02:12

## 2018-01-01 RX ADMIN — NOREPINEPHRINE BITARTRATE 3 MCG/KG/MIN: 1 INJECTION, SOLUTION, CONCENTRATE INTRAVENOUS at 06:12

## 2018-01-01 RX ADMIN — Medication 0.6 MCG/KG/MIN: at 02:12

## 2018-01-01 RX ADMIN — MAGNESIUM SULFATE 1 G: 1 INJECTION INTRAVENOUS at 12:12

## 2018-01-01 RX ADMIN — SODIUM CHLORIDE 1000 ML: 0.9 INJECTION, SOLUTION INTRAVENOUS at 05:12

## 2018-01-01 RX ADMIN — INSULIN ASPART 1 UNITS: 100 INJECTION, SOLUTION INTRAVENOUS; SUBCUTANEOUS at 11:12

## 2018-01-01 RX ADMIN — SODIUM BICARBONATE 50 MEQ: 84 INJECTION, SOLUTION INTRAVENOUS at 08:12

## 2018-01-01 RX ADMIN — AMIODARONE HYDROCHLORIDE 0.5 MG/MIN: 1.8 INJECTION, SOLUTION INTRAVENOUS at 07:12

## 2018-01-01 RX ADMIN — NOREPINEPHRINE BITARTRATE 3 MCG/KG/MIN: 1 INJECTION, SOLUTION, CONCENTRATE INTRAVENOUS at 08:12

## 2018-01-01 RX ADMIN — AMIODARONE HYDROCHLORIDE 1 MG/MIN: 1.8 INJECTION, SOLUTION INTRAVENOUS at 09:12

## 2018-01-01 RX ADMIN — OXYCODONE HYDROCHLORIDE AND ACETAMINOPHEN 1 TABLET: 5; 325 TABLET ORAL at 11:12

## 2018-01-01 RX ADMIN — CALCIUM GLUCONATE 2 G: 98 INJECTION, SOLUTION INTRAVENOUS at 12:12

## 2018-01-01 RX ADMIN — DEXMEDETOMIDINE HYDROCHLORIDE 1.4 MCG/KG/HR: 4 INJECTION, SOLUTION INTRAVENOUS at 10:12

## 2018-01-01 RX ADMIN — CALCIUM GLUCONATE 1 G: 98 INJECTION, SOLUTION INTRAVENOUS at 03:12

## 2018-01-01 RX ADMIN — DEXMEDETOMIDINE HYDROCHLORIDE 1.4 MCG/KG/HR: 4 INJECTION, SOLUTION INTRAVENOUS at 04:12

## 2018-01-01 RX ADMIN — Medication 0.7 MCG/KG/MIN: at 06:12

## 2018-01-01 RX ADMIN — PANTOPRAZOLE SODIUM 40 MG: 40 INJECTION, POWDER, FOR SOLUTION INTRAVENOUS at 09:12

## 2018-01-01 RX ADMIN — ALBUMIN HUMAN 12.5 G: 0.25 SOLUTION INTRAVENOUS at 07:12

## 2018-01-01 RX ADMIN — Medication 0.02 MCG/KG/MIN: at 07:12

## 2018-12-01 NOTE — ED NOTES
Pt here c/o chronic lower spinal pain that has been worse than normal for 2 weeks.Denies urinary,bowel changes. Denies fevers. States has productive clear or white sputum. States has only 1 percocet left. Took 1 yesterday. Skin is pale,dry, warm. Pt is AAOx3,clear speech. denies blood or black stools. resp are regular and unlabored at rest. Breath sounds are clear with diminished bases.

## 2018-12-01 NOTE — ED NOTES
reinstructed pt on urine collection. Encouraged fluid intake. Pt states there has been no change in pain level.
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
## 2018-12-01 NOTE — DISCHARGE INSTRUCTIONS
Additional instructions  Followup with your primary care physician in 2-3 days if you are not improving. You may need to follow up with a spine doctor. Take all your medications as prescribed. Return to the emergency department if you have increasing pain, chest pain, difficulty breathing,  nonstop vomiting, or any other concerns. Be sure to drink plenty of fluids to stay hydrated. Get plenty of rest. Please refer to additional educational material for further instructions.

## 2018-12-08 PROBLEM — R06.02 SHORTNESS OF BREATH: Status: ACTIVE | Noted: 2018-01-01

## 2018-12-08 NOTE — ED PROVIDER NOTES
Encounter Date: 12/8/2018    SCRIBE #1 NOTE: I, Kiki Belcher, am scribing for, and in the presence of,  Dr. Diamond. I have scribed the entire note.       History     Chief Complaint   Patient presents with    Shortness of Breath     increase in SOB and weakness, states he usually drinks ETOH daily and has been unable to get up to use the restroom, arrives covered in urine and feces. + productive cough with white sputum, warm to touch. EMS reports RA Sats of 60%     Time seen by the provider: 4:49 PM    This is a 68 y.o. male with a PMHx of chronic sciatica who presents to the Emergency Department via EMS with a cc of shortness of breath today. Per EMS, the pt's initial 02 SAT was in the lower 60's, but on oxygen they were able to bring him into the 80's. Pt also reports generalized weakness, wheezing, coughing, and chronic back pain x 1 week. He denies fever, vomiting, or abdominal pain. He admits to regularly drinking alchol every day, but states he hasn't drank in 5 days. He usually takes Percocet for his back pain. He denies history of lung problems.        The history is provided by the patient.     Review of patient's allergies indicates:   Allergen Reactions    Pcn [penicillins] Hives     History reviewed. No pertinent past medical history.  History reviewed. No pertinent surgical history.  History reviewed. No pertinent family history.  Social History     Tobacco Use    Smoking status: Never Smoker    Smokeless tobacco: Never Used   Substance Use Topics    Alcohol use: Yes     Alcohol/week: 12.6 oz     Types: 21 Cans of beer per week    Drug use: No     Review of Systems   Constitutional: Positive for fatigue (generalized weakness). Negative for chills and fever.   HENT: Negative for facial swelling, trouble swallowing and voice change.    Eyes: Negative for photophobia, pain and redness.   Respiratory: Positive for cough, shortness of breath and wheezing. Negative for choking.    Cardiovascular:  Negative for chest pain, palpitations and leg swelling.   Gastrointestinal: Negative for abdominal pain, diarrhea, nausea and vomiting.   Genitourinary: Negative for dysuria, frequency and urgency.   Musculoskeletal: Positive for back pain. Negative for neck pain and neck stiffness.   Neurological: Negative for seizures, speech difficulty, light-headedness, numbness and headaches.   All other systems reviewed and are negative.      Physical Exam     Initial Vitals   BP Pulse Resp Temp SpO2   12/08/18 1654 12/08/18 1646 12/08/18 1646 12/08/18 1718 12/08/18 1657   134/78 (!) 130 (!) 34 99 °F (37.2 °C) 99 %      MAP       --                Physical Exam    Nursing note and vitals reviewed.  Constitutional: He appears well-developed and well-nourished. No distress. Face mask in place.   HENT:   Head: Normocephalic and atraumatic.   Eyes: Conjunctivae and EOM are normal. Pupils are equal, round, and reactive to light.   Neck: Normal range of motion. Neck supple.   Cardiovascular: Regular rhythm. Tachycardia present.    No murmur heard.  Pulmonary/Chest: No respiratory distress. He has wheezes.   Faint expiratory wheezing bilaterally.   Abdominal: Soft. Bowel sounds are normal. He exhibits no distension.   Musculoskeletal: Normal range of motion. He exhibits no edema or tenderness.   Neurological: He is alert and oriented to person, place, and time.   Tremulous.   Skin: Skin is warm and dry.   Psychiatric: He has a normal mood and affect. His behavior is normal.         ED Course   Critical Care  Date/Time: 12/9/2018 7:22 AM  Performed by: Miki Diamond MD  Authorized by: Severyn Yaroshevsky, MD   Direct patient critical care time: 20 minutes  Additional history critical care time: 5 minutes  Ordering / reviewing critical care time: 15 minutes  Documentation critical care time: 15 minutes  Consulting other physicians critical care time: 5 minutes  Total critical care time (exclusive of procedural time) : 60  minutes  Critical care was time spent personally by me on the following activities: evaluation of patient's response to treatment, examination of patient, obtaining history from patient or surrogate, ordering and review of radiographic studies, ordering and review of laboratory studies, ordering and performing treatments and interventions, pulse oximetry and re-evaluation of patient's condition.        Labs Reviewed   CBC W/ AUTO DIFFERENTIAL - Abnormal; Notable for the following components:       Result Value    WBC 13.95 (*)     RBC 3.33 (*)     Hemoglobin 11.7 (*)     Hematocrit 36.0 (*)      (*)     MCH 35.1 (*)     RDW 16.3 (*)     Gran # (ANC) 12.6 (*)     Lymph # 0.5 (*)     Gran% 90.5 (*)     Lymph% 3.6 (*)     All other components within normal limits   COMPREHENSIVE METABOLIC PANEL - Abnormal; Notable for the following components:    CO2 18 (*)     Glucose 204 (*)     Calcium 6.8 (*)     Albumin 1.9 (*)     Total Bilirubin 1.6 (*)     Alkaline Phosphatase 172 (*)     AST 67 (*)     All other components within normal limits    Narrative:     Magnesium and Calcium- critical result(s) called and verbal readback   obtained from SAMIR Aguiar RN. , 12/08/2018 17:39   MAGNESIUM - Abnormal; Notable for the following components:    Magnesium 0.7 (*)     All other components within normal limits    Narrative:     Magnesium and Calcium- critical result(s) called and verbal readback   obtained from SAMIR Aguiar RN. , 12/08/2018 17:39   TROPONIN I - Abnormal; Notable for the following components:    Troponin I 0.286 (*)     All other components within normal limits   B-TYPE NATRIURETIC PEPTIDE - Abnormal; Notable for the following components:    BNP 4,248 (*)     All other components within normal limits   PROTIME-INR - Abnormal; Notable for the following components:    Prothrombin Time 16.9 (*)     INR 1.6 (*)     All other components within normal limits   APTT - Abnormal; Notable for the following components:     aPTT 37.7 (*)     All other components within normal limits   ISTAT PROCEDURE - Abnormal; Notable for the following components:    POC PH 7.289 (*)     POC PCO2 31.6 (*)     POC PO2 170 (*)     POC HCO3 15.2 (*)     POC TCO2 16 (*)     All other components within normal limits   ISTAT PROCEDURE - Abnormal; Notable for the following components:    POC PH 7.318 (*)     POC PO2 26 (*)     POC HCO3 19.5 (*)     POC SATURATED O2 43 (*)     POC TCO2 21 (*)     All other components within normal limits   ISTAT PROCEDURE - Abnormal; Notable for the following components:    POC PCO2 29.5 (*)     POC PO2 129 (*)     POC HCO3 16.4 (*)     POC TCO2 17 (*)     All other components within normal limits   CULTURE, BLOOD   ALCOHOL,MEDICAL (ETHANOL)   LIPASE   CK     EKG Readings: (Independently Interpreted)   Rate 185. Sinus Tachycardia. No ST elevations. Left axis deviation.       Imaging Results          CTA Chest Non-Coronary (PE Study) (Final result)  Result time 12/08/18 20:32:35    Final result by Jacinda Moody MD (12/08/18 20:32:35)                 Impression:      1. No evidence of PE.  2. Moderate bilateral pleural effusions, left greater than right, resulting in near complete collapse of the left lower lobe and partial collapse of the left upper and right lower lobes.  3. Small lobular liver with small to moderate volume ascites seen within the upper abdomen.  Correlate for clinical history of cirrhosis.  4. Large hiatal hernia.      Electronically signed by: Jacnida Moody MD  Date:    12/08/2018  Time:    20:32             Narrative:    EXAMINATION:  CTA CHEST NON CORONARY    CLINICAL HISTORY:  Dyspnea, cardiac origin suspected;    TECHNIQUE:  Low dose axial images, sagittal and coronal reformations were obtained from the thoracic inlet to the lung bases following the IV administration of 100 mL of Omnipaque 350.  Contrast timing was optimized to evaluate the pulmonary arteries.  MIP images were  performed.    COMPARISON:  None    FINDINGS:  Structures at the base of the neck are unremarkable.  Aorta is non-aneurysmal.  Heart is enlarged without pericardial effusion.  No intraluminal filling defects within the pulmonary arteries to suggest pulmonary thromboembolism.   There is no evidence of mediastinal, axillary, or hilar lymph node enlargement.  The esophagus maintains a normal course and caliber.    There is tracheobronchomalacia which could relate to expiratory phase of imaging.  Moderate bilateral pleural effusions are visualized, left greater than right.  This results in near complete collapse of the left lower lobe and partial collapse of the left upper and right lower lobes.    Liver is small and somewhat lobular in contour.  Small to moderate volume ascites is seen in the upper abdomen.  Large hiatal hernia is seen.  Remote healed bilateral rib fractures are seen.  Motion artifact is seen involving the lower sternum.  Suspected partially visualize remote right humeral shaft fracture is also seen.  Extrathoracic soft tissues are unremarkable.                               X-Ray Chest 1 View (Final result)  Result time 12/08/18 17:28:06    Final result by Jacinda Moody MD (12/08/18 17:28:06)                 Impression:      Small to moderate volume left pleural effusion with suspected left basilar atelectasis.  Possible mild pulmonary edema.  Potential superimposed consolidative process such as pneumonia difficult to exclude in the left lower lobe.      Electronically signed by: Jacinda Moody MD  Date:    12/08/2018  Time:    17:28             Narrative:    EXAMINATION:  XR CHEST 1 VIEW    CLINICAL HISTORY:  shortness of breath;    TECHNIQUE:  Single frontal view of the chest was performed.    COMPARISON:  07/12/2018.    FINDINGS:  Heart is enlarged but stable in size.  There is small to moderate volume left pleural effusion with suspected left basilar atelectasis.  Possible mild pulmonary edema  as well as possible small effusion on the right.  No evidence of pneumothorax.  No acute osseous abnormality identified.  Remote healed rib fractures are seen.                                 Medical Decision Making:   Clinical Tests:   Lab Tests: Ordered and Reviewed  Radiological Study: Ordered and Reviewed  Medical Tests: Ordered and Reviewed  ED Management:  68-year-old male presenting with shortness of breath. Chest x-ray shows pleural effusions.  Patient has a history of alcoholism, appearing to be in withdrawals.  He was given Ativan for this.  He was also found to be in atrial fibrillation with RVR, treated with Cardizem.  He also has a low magnesium level for which he was given 2 g intravenously.  He will be admitted by the [facility] team.                      Clinical Impression:     1. Shortness of breath    2. Tachycardia    3. Atrial fibrillation with RVR               I, Dr. Miki Diamond, personally performed the services described in this documentation. All medical record entries made by the scribe were at my direction and in my presence. I have reviewed the chart and agree that the record reflects my personal performance and is accurate and complete. Miki Diamond MD.  5:32 PM 12/08/2018                     Miki Diamond MD  12/09/18 0725

## 2018-12-08 NOTE — LETTER
December 11, 2018                       [address]  [place] [address]  Phone: [phone]  Fax: [phone]   December 11, 2018         To Whom it May Concern:    Please excuse Dimitrios Tao from school on 12/11/18 as he was present at [facility] ICU visiting a critically ill family member.    If you have any questions or concerns, please don't hesitate to call.    Sincerely,         Caprice Haskins RN

## 2018-12-09 PROBLEM — Z51.5 PALLIATIVE CARE ENCOUNTER: Status: ACTIVE | Noted: 2018-01-01

## 2018-12-09 PROBLEM — E87.6 HYPOKALEMIA: Status: ACTIVE | Noted: 2018-01-01

## 2018-12-09 PROBLEM — R31.9 URINARY TRACT INFECTION WITH HEMATURIA: Status: ACTIVE | Noted: 2018-01-01

## 2018-12-09 PROBLEM — J96.02 ACUTE RESPIRATORY FAILURE WITH HYPOXIA AND HYPERCAPNIA: Status: ACTIVE | Noted: 2018-01-01

## 2018-12-09 PROBLEM — R65.10 SIRS (SYSTEMIC INFLAMMATORY RESPONSE SYNDROME): Status: ACTIVE | Noted: 2018-01-01

## 2018-12-09 PROBLEM — E83.51 HYPOCALCEMIA: Status: ACTIVE | Noted: 2018-01-01

## 2018-12-09 PROBLEM — I50.9 CHF (CONGESTIVE HEART FAILURE): Status: ACTIVE | Noted: 2018-01-01

## 2018-12-09 PROBLEM — J96.01 ACUTE RESPIRATORY FAILURE WITH HYPOXIA AND HYPERCAPNIA: Status: ACTIVE | Noted: 2018-01-01

## 2018-12-09 PROBLEM — A41.9 SEPTIC SHOCK: Status: ACTIVE | Noted: 2018-01-01

## 2018-12-09 PROBLEM — R65.21 SEPTIC SHOCK: Status: ACTIVE | Noted: 2018-01-01

## 2018-12-09 PROBLEM — J90 PLEURAL EFFUSION: Status: ACTIVE | Noted: 2018-01-01

## 2018-12-09 PROBLEM — N39.0 URINARY TRACT INFECTION WITH HEMATURIA: Status: ACTIVE | Noted: 2018-01-01

## 2018-12-09 PROBLEM — R18.8 OTHER ASCITES: Status: ACTIVE | Noted: 2018-01-01

## 2018-12-09 PROBLEM — R16.0 HEPATOMEGALY: Status: ACTIVE | Noted: 2018-01-01

## 2018-12-09 PROBLEM — Z71.89 GOALS OF CARE, COUNSELING/DISCUSSION: Status: ACTIVE | Noted: 2018-01-01

## 2018-12-09 PROBLEM — I48.91 ATRIAL FIBRILLATION: Status: ACTIVE | Noted: 2018-01-01

## 2018-12-09 NOTE — PROGRESS NOTES
Dr. Martinez at bedside to perform thoracentisis. Md to use emergent consent. E-ICU popped in to assist with charting

## 2018-12-09 NOTE — PROCEDURES
"Thee Lane is a 68 y.o. male patient.    Temp: 98.5 °F (36.9 °C) (12/09/18 0745)  Pulse: (!) 136 (12/09/18 0845)  Resp: (!) 39 (12/09/18 0845)  BP: (!) 60/43 (12/09/18 0815)  SpO2: (!) 31 % (12/09/18 0830)  Weight: 74.8 kg (164 lb 14.5 oz) (12/08/18 2349)  Height: 5' 10" (177.8 cm) (12/08/18 2349)       Thoracentesis  Date/Time: 12/9/2018 9:38 AM  Location procedure was performed: [facility] PULMONARY MEDICINE  Performed by: Brock Martinez MD  Authorized by: Brock Martinez MD   Assisting provider: Chuy Price MD  Pre-operative diagnosis: bilateral pleural effusions  Post-operative diagnosis: bilateral pleural effusions  Consent Done: Emergent Situation  Procedure purpose: diagnostic and therapeutic  Indications: pleural effusion  Preparation: Patient was prepped and draped in the usual sterile fashion.  Local anesthesia used: yes    Anesthesia:  Local anesthesia used: yes  Local Anesthetic: lidocaine 1% without epinephrine  Anesthetic total: 5 mL  Patient sedated: yes  Sedatives: propofol  Analgesia: fentanyl  Preparation: skin prepped with ChloraPrep  Patient position: sitting  Ultrasound guidance: yes  Location: left anterior  Intercostal space: 5th  Puncture method: over-the-needle catheter  Needle size: 16  Catheter size: 14 gauge  Number of attempts: 1  Drainage amount: 1100 ml  Drainage characteristics: serous  Patient tolerance: Patient tolerated the procedure well with no immediate complications  Chest x-ray performed: yes  Chest x-ray interpreted by radiologist.  Complications: No  Estimated blood loss (mL): 0  Specimens: Yes  Implants: No          Brock Martinez  12/9/2018  "

## 2018-12-09 NOTE — CONSULTS
[facility]  Cardiology  Consult Note    Patient Name: Thee Lane  MRN: [id number]  Admission Date: 12/8/2018  Hospital Length of Stay: 1 days  Code Status: Full Code   Attending Provider: [facility] FP  Consulting Provider: Rico Barber MD  Primary Care Physician: Mak Wilkins MD  Principal Problem:SIRS (systemic inflammatory response syndrome)    Patient information was obtained from chart review     Consults  Subjective:     Chief Complaint:  Shortness of breath       HPI:   68 year old intubated      Admitted with dyspnea + afib w rvr.     CTA for PE.   He became hypotension very quickly in the ED  ECG afib with RVR      Amiodarone was started for afib treatment as he could not tolerated diltiazem      He is currently on levophed + amiodarone      Pertinent labs:      H/H 11.7/36 plt 214  WBC 13.9      Bun/cr 14/1.2  K 3.3  Serum CO2 15  Lactic acid 5.1 to 3.3 this am      TNI 0.0585  TB 1.1  INR 1.6  Ca 6.8      Alk phos 153  AST 59  ALT nl      UA + Ni/Le/blood  UA > 50 RBCs, > 100 WBCs, many bacteria        CT massive bl pleural effusion L >> R    ETOH + Toxic screen -negative      BNP 4248    History reviewed. No pertinent past medical history.    History reviewed. No pertinent surgical history.    Review of patient's allergies indicates:   Allergen Reactions    Pcn [penicillins] Hives       No current facility-administered medications on file prior to encounter.      Current Outpatient Medications on File Prior to Encounter   Medication Sig    oxycodone HCl/acetaminophen (PERCOCET ORAL) Take by mouth.    traMADol (ULTRAM) 50 mg tablet Take 1 tablet (50 mg total) by mouth every 6 (six) hours as needed for Pain.     Family History     None        Tobacco Use    Smoking status: Never Smoker    Smokeless tobacco: Never Used   Substance and Sexual Activity    Alcohol use: Yes     Alcohol/week: 12.6 oz     Types: 21 Cans of beer per week    Drug use: No    Sexual activity: Not on file      Review of Systems   Unable to perform ROS: intubated     Objective:     Vital Signs (Most Recent):  Temp: 98.4 °F (36.9 °C) (12/09/18 1145)  Pulse: (!) 132 (12/09/18 1300)  Resp: (!) 24 (12/09/18 1300)  BP: (!) 76/55 (12/09/18 1200)  SpO2: 100 % (12/09/18 1300) Vital Signs (24h Range):  Temp:  [98 °F (36.7 °C)-99 °F (37.2 °C)] 98.4 °F (36.9 °C)  Pulse:  [103-193] 132  Resp:  [19-65] 24  SpO2:  [31 %-100 %] 100 %  BP: ()/() 76/55  Arterial Line BP: (68-98)/(57-77) 83/63     Weight: 74.8 kg (164 lb 14.5 oz)  Body mass index is 23.66 kg/m².    SpO2: 100 %  O2 Device (Oxygen Therapy): ventilator      Intake/Output Summary (Last 24 hours) at 12/9/2018 1306  Last data filed at 12/9/2018 1300  Gross per 24 hour   Intake 2674.32 ml   Output 1434 ml   Net 1240.32 ml       Lines/Drains/Airways     Central Venous Catheter Line                 Percutaneous Central Line Insertion/Assessment - triple lumen  12/08/18 2318 right internal jugular less than 1 day          Drain                 NG/OG Tube 12/09/18 0200 [facility] mouth less than 1 day         Urethral Catheter 12/08/18 2231 less than 1 day          Airway                 Airway - Non-Surgical 12/09/18 0021 Endotracheal Tube less than 1 day          Arterial Line                 Arterial Line 12/09/18 0751 Left Radial less than 1 day          Peripheral Intravenous Line                 Peripheral IV - Single Lumen 12/08/18 1729 Right Antecubital less than 1 day         Peripheral IV - Single Lumen 12/08/18 1751 Right Hand less than 1 day         Peripheral IV - Single Lumen 12/08/18 2105 Right Forearm less than 1 day                Physical Exam   Constitutional: He is oriented to person, place, and time. He appears well-developed and well-nourished. He has a sickly appearance. He is intubated.   HENT:   Head: Normocephalic and atraumatic.   Right Ear: External ear normal.   Left Ear: External ear normal.   Mouth/Throat: Oropharynx is clear and  moist.   Eyes: Conjunctivae and EOM are normal. Pupils are equal, round, and reactive to light. Right eye exhibits no discharge. Left eye exhibits no discharge. No scleral icterus.   Neck: Normal range of motion. Neck supple. Normal carotid pulses, no hepatojugular reflux and no JVD present. Carotid bruit is not present. No tracheal deviation present. No thyromegaly present.   Cardiovascular: S1 normal and S2 normal. An irregularly irregular rhythm present.  No extrasystoles are present. Tachycardia present. PMI is not displaced. Exam reveals no gallop, no S3, no distant heart sounds, no friction rub and no midsystolic click.   No murmur heard.  Pulses:       Carotid pulses are 2+ on the right side, and 2+ on the left side.       Radial pulses are 2+ on the right side, and 2+ on the left side.        Femoral pulses are 2+ on the right side, and 2+ on the left side.       Popliteal pulses are 2+ on the right side, and 2+ on the left side.        Dorsalis pedis pulses are 1+ on the right side, and 1+ on the left side.        Posterior tibial pulses are 1+ on the right side, and 1+ on the left side.   Pulmonary/Chest: Effort normal. No accessory muscle usage or stridor. No apnea, no tachypnea and no bradypnea. He is intubated. No respiratory distress. He has decreased breath sounds in the right lower field, the left upper field, the left middle field and the left lower field. He has no wheezes. He has no rales. He exhibits no tenderness and no bony tenderness.   Abdominal: He exhibits no distension, no pulsatile liver, no abdominal bruit, no ascites, no pulsatile midline mass and no mass. There is no tenderness. There is no rebound and no guarding.   Musculoskeletal: He exhibits no edema or tenderness.   Lymphadenopathy:     He has no cervical adenopathy.   Neurological: He is alert and oriented to person, place, and time. He has normal reflexes. No cranial nerve deficit. Coordination normal.   Skin: Skin is warm. No  rash noted. No erythema. No pallor.   Psychiatric:   Intubated        Significant Labs: see above as well      LABS  CBC  Recent Labs   Lab 12/08/18  1700 12/09/18  0417   WBC 13.95* 11.05   RBC 3.33* 2.85*   HGB 11.7* 10.0*   HCT 36.0* 29.9*    153   * 105*   MCH 35.1* 35.1*   MCHC 32.5 33.4     BMP  Recent Labs   Lab 12/09/18  0434 12/09/18  0829 12/09/18  1013   * 131* 130*   K 3.7 3.5 3.3*   CO2 18* 15* 15*    104 103   BUN 13 14 14   CREATININE 1.2 1.2 1.2   * 231* 224*       POCT-Glucose  No results found for: POCTGLUCOSE    Recent Labs   Lab 12/09/18  0434 12/09/18  0829 12/09/18  1013   CALCIUM 6.5* 6.7* 6.7*   MG 0.9* 1.8 1.6   PHOS 4.6* 4.0  --      LFT  Recent Labs   Lab 12/09/18  0434 12/09/18  0829 12/09/18  1013   PROT 5.2* 5.1* 5.1*   ALBUMIN 1.5* 1.4* 1.4*   BILITOT 1.1* 1.0 1.1*   AST 54* 62* 59*   ALKPHOS 132 141* 153*   ALT 20 19 19     GFR     COAGS  Recent Labs   Lab 12/08/18  1700   INR 1.6*   APTT 37.7*     CE  Recent Labs   Lab 12/08/18  2359 12/09/18  0829 12/09/18  1013   TROPONINI 0.427* 0.528* 0.585*     ABGs  Recent Labs   Lab 12/09/18  0827   PH 7.394   PCO2 23.6*   PO2 85   HCO3 14.4*   POCSATURATED 97   BE -10     BNP  Recent Labs   Lab 12/08/18  1700   BNP 4,248*       LAST HbA1c  Lab Results   Component Value Date    HGBA1C 5.3 12/09/2018         Significant Imaging:     Imaging Results          CTA Chest Non-Coronary (PE Study) (Final result)  Result time 12/08/18 20:32:35    Final result by Jacinda Moody MD (12/08/18 20:32:35)                 Impression:      1. No evidence of PE.  2. Moderate bilateral pleural effusions, left greater than right, resulting in near complete collapse of the left lower lobe and partial collapse of the left upper and right lower lobes.  3. Small lobular liver with small to moderate volume ascites seen within the upper abdomen.  Correlate for clinical history of cirrhosis.  4. Large hiatal  hernia.      Electronically signed by: Jacinda Moody MD  Date:    12/08/2018  Time:    20:32             Narrative:    EXAMINATION:  CTA CHEST NON CORONARY    CLINICAL HISTORY:  Dyspnea, cardiac origin suspected;    TECHNIQUE:  Low dose axial images, sagittal and coronal reformations were obtained from the thoracic inlet to the lung bases following the IV administration of 100 mL of Omnipaque 350.  Contrast timing was optimized to evaluate the pulmonary arteries.  MIP images were performed.    COMPARISON:  None    FINDINGS:  Structures at the base of the neck are unremarkable.  Aorta is non-aneurysmal.  Heart is enlarged without pericardial effusion.  No intraluminal filling defects within the pulmonary arteries to suggest pulmonary thromboembolism.   There is no evidence of mediastinal, axillary, or hilar lymph node enlargement.  The esophagus maintains a normal course and caliber.    There is tracheobronchomalacia which could relate to expiratory phase of imaging.  Moderate bilateral pleural effusions are visualized, left greater than right.  This results in near complete collapse of the left lower lobe and partial collapse of the left upper and right lower lobes.    Liver is small and somewhat lobular in contour.  Small to moderate volume ascites is seen in the upper abdomen.  Large hiatal hernia is seen.  Remote healed bilateral rib fractures are seen.  Motion artifact is seen involving the lower sternum.  Suspected partially visualize remote right humeral shaft fracture is also seen.  Extrathoracic soft tissues are unremarkable.                               X-Ray Chest 1 View (Final result)  Result time 12/08/18 17:28:06    Final result by Jacinda Moody MD (12/08/18 17:28:06)                 Impression:      Small to moderate volume left pleural effusion with suspected left basilar atelectasis.  Possible mild pulmonary edema.  Potential superimposed consolidative process such as pneumonia difficult to  exclude in the left lower lobe.      Electronically signed by: Jacinda Moody MD  Date:    12/08/2018  Time:    17:28             Narrative:    EXAMINATION:  XR CHEST 1 VIEW    CLINICAL HISTORY:  shortness of breath;    TECHNIQUE:  Single frontal view of the chest was performed.    COMPARISON:  07/12/2018.    FINDINGS:  Heart is enlarged but stable in size.  There is small to moderate volume left pleural effusion with suspected left basilar atelectasis.  Possible mild pulmonary edema as well as possible small effusion on the right.  No evidence of pneumothorax.  No acute osseous abnormality identified.  Remote healed rib fractures are seen.                                  Assessment and Plan:     * SIRS (systemic inflammatory response syndrome)        Pertinent labs:      H/H 11.7/36 plt 214  WBC 13.9      Bun/cr 14/1.2  K 3.3  Serum CO2 15  Lactic acid 5.1 to 3.3 this am      TNI 0.0585  TB 1.1  INR 1.6  Ca 6.8      Alk phos 153  AST 59  ALT nl      UA + Ni/Le/blood  UA > 50 RBCs, > 100 WBCs, many bacteria        CT massive bl pleural effusion L >> R    ETOH + Toxic screen -negative      BNP 4248        Abnormal BNP + TNI + INR + T.B + ascites + massive pleural effusion are concerning for CHF with hepatic congestion. He also has an UTI + leukocytosis + SIRS + acidosis             Rec:        Monitor CVP  Obtain echo in am  Agree with pressors to maintain MAP > 65 for organ perfusion  Amiodarone-afib will improve as SIRS is addressed  Thoracentesis  Antibiotics  Urine, blood, pleural effusion culture  Replace K + Ca++  Vent support  Serial ABG to monitor acidosis         Will continue to follow with you                        Patient Active Problem List    Diagnosis Date Noted    SIRS (systemic inflammatory response syndrome) 12/09/2018    Atrial fibrillation 12/09/2018    Septic shock 12/09/2018    Urinary tract infection with hematuria 12/09/2018    Acute respiratory failure with hypoxia and hypercapnia  12/09/2018    Pleural effusion 12/09/2018    Hypokalemia 12/09/2018    Hypocalcemia 12/09/2018    Other ascites 12/09/2018    CHF (congestive heart failure) 12/09/2018    Hepatomegaly 12/09/2018    Shortness of breath 12/08/2018       VTE Risk Mitigation (From admission, onward)        Ordered     enoxaparin injection 40 mg  Daily      12/09/18 1137     Place sequential compression device  Until discontinued      12/09/18 0023          Thank you for your consult.    Rico Barber MD  Cardiology   [facility]

## 2018-12-09 NOTE — CONSULTS
"Consult Note  Palliative Care    Thank you for opportunity to participate in Mr. Lane's care    Consult Requested By: Severyn Yaroshevsky, MD  Reason for Consult: Goals of care discussion/advance care planning    SUBJECTIVE:     History of Present Illness:  Disease Process: respiratory disease      Patient is a 68 y.o. male presents to the ED c/o of SOB x 1 day prior to arrival to the ED. Patient was reportedly sating the 60s, brought up to the 80's on transport and improved to the 90's while in the ED. Patient reported generalized weakness, coughing, wheezing and back pain x 1 week prior to presentation, however denies any subjective fever, chills, n/v. Patient reports that this has never happened in the past. Denies any past smoking history. Patient denies history of EToH abuse. ED reported, patient has not drank in 5 days which was noted on obtained history, however patient drinks 3-4 beers does so intermittently and has never had symptoms of EtoH withdrawal.      On arrival to the ED patient's vitals were /78, P 130, R 34 Temp 99 and Spow 99%. Patient was found to D-dimer +. CTA completed showed B/l pleural effusions with collapse of Left and right lower lobe. While in the ED , patient wernt into Afib with RVR received diltiazem x2. Blood pressure dropped to 80's/50s and patient started on levophed drip. Cards consulted and reccommended Amiodarone drip which will be continued on admission to the ICU. Patient received Lasix 80mg x 1. Mg 2g after Mg level low at 0.7,. Ativan x 2 for anxiety.       Cardiology consulted started on Amiodarone for Afib as patient nota tolerating diltiazem.    Pulmonary consulted for thoracentesis for bilateral pleural effusions      Palliative have been consulted for goals of care discussion/advance care planning    Palliative care met with patient in the room. Unresponsive, no family at bedside, and no contact number on chart. Reported a "friend had visited earlier and " stayed fr about 30 minutes and left.   Looking though the record patient was seen last week in the ED for lower back pain.         History reviewed. No pertinent past medical history.  History reviewed. No pertinent surgical history.  History reviewed. No pertinent family history.  Social History     Tobacco Use    Smoking status: Never Smoker    Smokeless tobacco: Never Used   Substance Use Topics    Alcohol use: Yes     Alcohol/week: 12.6 oz     Types: 21 Cans of beer per week    Drug use: No       Mental Status: Non-responsive    ECOG Performance Status Grade: 4 - Completely disabled    Review of Systems:  Review of systems not obtained due to patient factors unresponsive.    OBJECTIVE:     Pain Assessment: unable to assess    Decision-Making Capacity: Patient unable to communicate due to disease severity/cognitive impairment    Advanced Directives:  Living Will: No  Do Not Resuscitate Status: No  Medical Power of : No  Registered Organ Donor: No    Living Arrangements: Lives with friend    Psychosocial, Spiritual, Cultural:  Patient's most important priorities:  Unable to assess    Patient's biggest concerns/fears:  Unable to assess    Previous death/end of life care history:  Unable to assess    Patient's goals/hopes:  Unable to assess    ASSESSMENT/PLAN:     Recommendations:  Continue medical treatment  Code status: Full code  Consult social work   Palliative care will continue to assist for contact      Thank you for this consult and the opportunity to participate in Mr. Lane's care.    Minerva Davidson MD, MPH  Palliative Medicine  [facility]   [phone]      > 50% of 75 min visit spent in chart review, symptom assessment and  coordination of care.

## 2018-12-09 NOTE — H&P
[facility]   History & Physical    Patient Name: Thee Lane  MRN: [id number]  Admission Date: 12/8/2018  Attending Physician: Severyn Yarochevsky, MD  Primary Care Provider: Mak Wilkins MD     Subjective:     Chief Complaint/Reason for Admission: shortness of breath     History of Present Illness:  Patient is a 68 y.o. male presents to the ED c/o of SOB x 1 day prior to arrival to the ED. Patient was reportedly sating the 60s, brought up to the 80's on transport and improved to the 90's while in the ED. Patient reported generalized weakness, coughing, wheezing and back pain x 1 week prior to presentation, however denies any subjective fever, chills, n/v. Patient reports that this has never happened in the past. Denies any past smoking history. Patient denies history of EToH abuse. ED reported, patient has not drank in 5 days which was noted on obtained history, however patient drinks 3-4 beers does so intermittently and has never had symptoms of EtoH withdrawal.     On arrival to the ED patient's vitals were /78, P 130, R 34 Temp 99 and Spow 99%. Patient was found to D-dimer +. CTA completed showed B/l pleural effusions with collapse of Left and right lower lobe. While in the ED , patient wernt into Afib with RVR received diltiazem x2. Blood pressure dropped to 80's/50s and patient started on levophed drip. Cards consulted and reccommended Amiodarone drip which will be continued on admission to the ICU. Patient received Lasix 80mg x 1. Mg 2g after Mg level low at 0.7,. Ativan x 2 for anxiety. Family medicine then called to admit the patient for continued care.     No current facility-administered medications on file prior to encounter.      Current Outpatient Medications on File Prior to Encounter   Medication Sig    oxycodone HCl/acetaminophen (PERCOCET ORAL) Take by mouth.    traMADol (ULTRAM) 50 mg tablet Take 1 tablet (50 mg total) by mouth every 6 (six) hours as  needed for Pain.       Review of patient's allergies indicates:   Allergen Reactions    Pcn [penicillins] Hives       History reviewed. No pertinent past medical history.  History reviewed. No pertinent surgical history.  Family History     None        Tobacco Use    Smoking status: Never Smoker    Smokeless tobacco: Never Used   Substance and Sexual Activity    Alcohol use: Yes     Alcohol/week: 12.6 oz     Types: 21 Cans of beer per week    Drug use: No    Sexual activity: Not on file     Review of Systems   Constitutional: Positive for fatigue. Negative for chills, diaphoresis and fever.   HENT: Negative for trouble swallowing.    Eyes: Negative for photophobia.   Respiratory: Positive for shortness of breath. Negative for cough, choking and chest tightness.    Cardiovascular: Positive for palpitations. Negative for chest pain.   Gastrointestinal: Negative for abdominal pain, constipation, diarrhea, nausea and vomiting.   Endocrine: Negative for polyuria.   Genitourinary: Negative for flank pain.   Musculoskeletal: Positive for arthralgias and back pain. Negative for joint swelling.   Skin: Negative for color change and pallor.   Allergic/Immunologic: Negative for environmental allergies.   Neurological: Negative for headaches.   Hematological: Negative for adenopathy.   Psychiatric/Behavioral: Negative for agitation.     Objective:     Vital Signs (Most Recent):  Temp: 98.5 °F (36.9 °C) (12/08/18 1913)  Pulse: (!) 122 (12/08/18 2125)  Resp: (!) 34 (12/08/18 2125)  BP: (!) 85/54 (12/08/18 2125)  SpO2: (!) 94 % (12/08/18 2009) Vital Signs (24h Range):  Temp:  [98.5 °F (36.9 °C)-99 °F (37.2 °C)] 98.5 °F (36.9 °C)  Pulse:  [103-189] 122  Resp:  [29-39] 34  SpO2:  [94 %-100 %] 94 %  BP: ()/(51-89) 85/54     Weight: 74.8 kg (165 lb)  Body mass index is 23.68 kg/m².    Physical Exam   Constitutional: He is oriented to person, place, and time. He appears well-developed and well-nourished.   HENT:   Head:  Normocephalic and atraumatic.   Eyes: EOM are normal. Pupils are equal, round, and reactive to light.   Neck: Normal range of motion. Neck supple.   Cardiovascular:   Tachycardic. Unable to distinguish s1 and s2. Heart rhythm also irregular.    Pulmonary/Chest: Effort normal.   Crackles in the lower lobes b/l. Also decreased breath sounds b/l    Abdominal: Soft. Bowel sounds are normal.   Musculoskeletal: Normal range of motion.   Neurological: He is alert and oriented to person, place, and time.   Skin: Capillary refill takes 2 to 3 seconds. There is pallor.   Patient with cool extremities b/l and dry.    Psychiatric: He has a normal mood and affect.       Significant Labs:  CBC:   Recent Labs   Lab 12/08/18 1700   WBC 13.95*   RBC 3.33*   HGB 11.7*   HCT 36.0*      *   MCH 35.1*   MCHC 32.5     BMP:   Recent Labs   Lab 12/08/18 1700   *      K 4.0      CO2 18*   BUN 11   CREATININE 1.2   CALCIUM 6.8*   MG 0.7*     CMP:   Recent Labs   Lab 12/08/18 1700   *   CALCIUM 6.8*   ALBUMIN 1.9*   PROT 6.1      K 4.0   CO2 18*      BUN 11   CREATININE 1.2   ALKPHOS 172*   ALT 26   AST 67*   BILITOT 1.6*     LFTs:   Recent Labs   Lab 12/08/18 1700   ALT 26   AST 67*   ALKPHOS 172*   BILITOT 1.6*   PROT 6.1   ALBUMIN 1.9*     Coagulation:   Recent Labs   Lab 12/08/18 1700   LABPROT 16.9*   INR 1.6*   APTT 37.7*     Cardiac markers:   Recent Labs   Lab 12/08/18 1700   TROPONINI 0.286*     ABGs:   Recent Labs   Lab 12/08/18 2110   PH 7.354   PCO2 29.5*   PO2 129*   HCO3 16.4*   POCSATURATED 99   BE -9     Microbiology Results (last 7 days)     Procedure Component Value Units Date/Time    Blood culture (site 1) [[id number]] Collected:  12/08/18 2133    Order Status:  Sent Specimen:  Blood from Peripheral, Antecubital, Left Updated:  12/08/18 2134    Blood culture (site 2) [[id number]]     Order Status:  No result Specimen:  Blood           Significant Diagnostics:  Imaging  Results          CTA Chest Non-Coronary (PE Study) (Final result)  Result time 12/08/18 20:32:35    Final result by Jacinda Moody MD (12/08/18 20:32:35)                 Impression:      1. No evidence of PE.  2. Moderate bilateral pleural effusions, left greater than right, resulting in near complete collapse of the left lower lobe and partial collapse of the left upper and right lower lobes.  3. Small lobular liver with small to moderate volume ascites seen within the upper abdomen.  Correlate for clinical history of cirrhosis.  4. Large hiatal hernia.      Electronically signed by: Jacinda Moody MD  Date:    12/08/2018  Time:    20:32             Narrative:    EXAMINATION:  CTA CHEST NON CORONARY    CLINICAL HISTORY:  Dyspnea, cardiac origin suspected;    TECHNIQUE:  Low dose axial images, sagittal and coronal reformations were obtained from the thoracic inlet to the lung bases following the IV administration of 100 mL of Omnipaque 350.  Contrast timing was optimized to evaluate the pulmonary arteries.  MIP images were performed.    COMPARISON:  None    FINDINGS:  Structures at the base of the neck are unremarkable.  Aorta is non-aneurysmal.  Heart is enlarged without pericardial effusion.  No intraluminal filling defects within the pulmonary arteries to suggest pulmonary thromboembolism.   There is no evidence of mediastinal, axillary, or hilar lymph node enlargement.  The esophagus maintains a normal course and caliber.    There is tracheobronchomalacia which could relate to expiratory phase of imaging.  Moderate bilateral pleural effusions are visualized, left greater than right.  This results in near complete collapse of the left lower lobe and partial collapse of the left upper and right lower lobes.    Liver is small and somewhat lobular in contour.  Small to moderate volume ascites is seen in the upper abdomen.  Large hiatal hernia is seen.  Remote healed bilateral rib fractures are seen.  Motion  artifact is seen involving the lower sternum.  Suspected partially visualize remote right humeral shaft fracture is also seen.  Extrathoracic soft tissues are unremarkable.                               X-Ray Chest 1 View (Final result)  Result time 12/08/18 17:28:06    Final result by Jacinda Moody MD (12/08/18 17:28:06)                 Impression:      Small to moderate volume left pleural effusion with suspected left basilar atelectasis.  Possible mild pulmonary edema.  Potential superimposed consolidative process such as pneumonia difficult to exclude in the left lower lobe.      Electronically signed by: Jacinda Moody MD  Date:    12/08/2018  Time:    17:28             Narrative:    EXAMINATION:  XR CHEST 1 VIEW    CLINICAL HISTORY:  shortness of breath;    TECHNIQUE:  Single frontal view of the chest was performed.    COMPARISON:  07/12/2018.    FINDINGS:  Heart is enlarged but stable in size.  There is small to moderate volume left pleural effusion with suspected left basilar atelectasis.  Possible mild pulmonary edema as well as possible small effusion on the right.  No evidence of pneumothorax.  No acute osseous abnormality identified.  Remote healed rib fractures are seen.                                  Assessment/Plan:   68 year old male presents to the ED with Acute Respiratory Distress     Acute Respiratory Distress Syndrome   -Patient with desaturation prior to presentation   -B/l pleural effusions with collapse of left lower lobe and right lower lobe + right upper lobe   -D-dimer +, however PE negative   -Patient received lasix 80mg x 1 in the ED  -TTE ordered  -BNP 4,248  -Will likely start BiPaP tonight   -Pulmonary consulted     SIRS  - WBC 13 and Tachycardic   - Blood culture x 2 pending   - UA pending     Elevated troponin  -0.286  -Possible demand ischemia   -Will continue to trend   -Cardiology consulted     Afib with RVR  -Patient with new onset Afib   -Heart rate uncontrolled  -Given  diltiazem x 2   -Currently on Amiodarone drip       Active Diagnoses:    Diagnosis Date Noted POA    Shortness of breath [R06.02] 12/08/2018 Yes      Problems Resolved During this Admission:     VTE Risk Mitigation (From admission, onward)    None        Consults: Pulmonary and cardiology   PPx: Pantoprazole 40mg IV and SCDs    Diet: NPO  Dispo: Pending clinical improvement. Needing tx for b/l pleural effusions.     Rohit Ivan MD  Family Medicine   [facility]

## 2018-12-09 NOTE — ANESTHESIA PROCEDURE NOTES
Central Line    Diagnosis: Respiratory distress  Patient location during procedure: ICU  Procedure start time: 12/8/2018 11:11 PM  Timeout: 12/8/2018 11:10 PM  Procedure end time: 12/8/2018 11:20 PM  Staffing  Anesthesiologist: Abdulaziz Alexandre MD  Performed: anesthesiologist   Anesthesiologist was present at the time of the procedure.  Preanesthetic Checklist  Completed: patient identified, site marked, surgical consent, pre-op evaluation, timeout performed, IV checked, risks and benefits discussed, monitors and equipment checked and anesthesia consent given  Indication  Indication: hemodynamic monitoring, med administration, vascular access     Anesthesia   general anesthesia    Central Line  Skin Prep: skin prepped with ChloraPrep, skin prep agent completely dried prior to procedure  maximum sterile barriers used during central venous catheter insertion  hand hygiene performed prior to central venous catheter insertion  Location: right, internal jugular.   Catheter type: triple lumen  Catheter Size: 7 Fr  Inserted Catheter Length: 16 cm  Ultrasound: vascular probe with ultrasound   Vessel Caliber: medium, patent, compressibility normal  Needle advanced into vessel with real time Ultrasound guidance.  Guidewire confirmed in vessel.  Image recorded and saved.  Manometry: none  Insertion Attempts: 1   Securement:line sutured, chlorhexidine patch, sterile dressing applied and blood return through all ports     Post-Procedure  X-Ray: successful placement   Adverse Events:none

## 2018-12-09 NOTE — PROGRESS NOTES
Patient seen and examined by me. I agree with the trainee's separate note except as modified.     68 year old male with PMH of chronic pain came in with 1 day of SOB, and 1 week of weakness and cough. Found to hypoxemic. In the ED had rapid Afib. Given Cardizem, then hypotensive, placed on Levophed and Amio. Got IVFs, then Lasix. BIPAP trialed but was subsequently intubated.    S/p left thoracentesis this AM--serous fluid     Afebrile, -193, I/O 2000/204. MAP labile    WBC 14 to 11, 90% segs. Bicarb 18, creatinine 1.2, AST 67, Tbil 1.6, INR 1.6. BNP 4248. CTNI 0.29 to 0.43. lactate 5.1. Procalcitonin 3.13. U/A + for infection. 7.40/24/276/15/100% on BIPAP.    CTA reviewed by me: no PE. Moderate L>R pleural effusions with partial collapse of LLL, AGUILAR, RLL. Liver appearance c/w cirrhosis. Small amount of ascites. Large hiatal hernia.    On my exam: diffuse anasarca. Chest wall edema. Cardiac ultrasound done by us with difficult views but LVEF appears to be ~20%    Impression: Appears to have acute heart failure with high BNP and bilateral pleural effusions. Also with a UTI, which may have tipped him over into failure    Plan:   -norepi for BP support to keep   -no further IVFs at this time, patient is in heart failure with pleural effusions  -needs more aggressive diuresis--recommend lasix gtt  -f/u pleural fluid studies  -formal echo pending  -check hepatitis serologies due to cirrhosis on imaging  -cont Amio  -recheck K and Mag later today. Replete both now  -enoxaparin prophylaxis   -OK with cefepime for UTI, would stop vanco  -blood sugar control, goal glucose <120. Check A1c   -oral PPI    Critical care time (30min) spent personally by me on the following activities: development of treatment plan with patient or surrogate and bedside caregivers, discussions with consultants, evaluation of patient's response to treatment, examination of patient, ordering and performing treatments and interventions, ordering  and review of laboratory studies, ordering and review of radiographic studies, pulse oximetry, re-evaluation of patient's condition. This critical care time did not overlap with that of any other provider or involve time for any procedures.

## 2018-12-09 NOTE — ED NOTES
"Dr. Garcia at bedside to evaluate pt. Ordered cardizem administered. HR now low 100's, pt reports "feeling a bit better." pt still SOB with RR 30's. Awaiting further orders.  "

## 2018-12-09 NOTE — PROGRESS NOTES
Family medicine team called and updated on pt's status. Team to call anesthesia to bedside to come place a-line. Will continue to monitor

## 2018-12-09 NOTE — HPI
68 year old intubated      Admitted with dyspnea + afib w rvr.     CTA for PE.   He became hypotension very quickly in the ED  ECG afib with RVR      Amiodarone was started for afib treatment as he could not tolerated diltiazem      He is currently on levophed + amiodarone      Pertinent labs:      H/H 11.7/36 plt 214  WBC 13.9      Bun/cr 14/1.2  K 3.3  Serum CO2 15  Lactic acid 5.1 to 3.3 this am      TNI 0.0585  TB 1.1  INR 1.6  Ca 6.8      Alk phos 153  AST 59  ALT nl      UA + Ni/Le/blood  UA > 50 RBCs, > 100 WBCs, many bacteria        CT massive bl pleural effusion L >> R    ETOH + Toxic screen -negative      BNP 4248

## 2018-12-09 NOTE — PLAN OF CARE
Spoke with Pulmonary. Patient to be intubated with subsequent thoracentesis to be completed tomorrow AM on arrival. Will trend LA as patient has metabolic acidosis. Possible Pyelonephritis/nephrolithiasis. Patient likely with Sepsis with urinary source.  will start abx.

## 2018-12-09 NOTE — EICU
Elert to bedside for time out for central line placement per Dr. Alexandre.  See time out flowsheet.

## 2018-12-09 NOTE — PROGRESS NOTES
"Vancomycin Dosing and Monitoring Pharmacy Protocol    Thee Lane is a 68 y.o. male    Height: 5' 10" (1.778 m)   Wt Readings from Last 3 Encounters:   12/08/18 74.8 kg (164 lb 14.5 oz)   12/01/18 74.8 kg (165 lb)       Temp Readings from Last 3 Encounters:   12/09/18 98.5 °F (36.9 °C) (Oral)   12/01/18 98.1 °F (36.7 °C) (Oral)      Lab Results   Component Value Date/Time    WBC 11.05 12/09/2018 04:17 AM    WBC 13.95 (H) 12/08/2018 05:00 PM      Lab Results   Component Value Date/Time    CREATININE 1.2 12/09/2018 08:29 AM    CREATININE 1.2 12/09/2018 04:34 AM    CREATININE 1.2 12/08/2018 05:00 PM      Lab Results   Component Value Date/Time    LACTATE 3.3 (H) 12/09/2018 08:29 AM    LACTATE 5.1 (HH) 12/09/2018 12:43 AM       Serum creatinine: 1.2 mg/dL 12/09/18 0829  Estimated creatinine clearance: 60 mL/min    Antibiotics (From admission, onward)      Start     Stop Route Frequency Ordered    12/10/18 1100  vancomycin (VANCOCIN) 2,000 mg in dextrose 5 % 500 mL IVPB      -- IV Every 24 hours (non-standard times) 12/09/18 1007    12/09/18 1100  vancomycin (VANCOCIN) 2,250 mg in dextrose 5 % 500 mL IVPB      -- IV Once 12/09/18 1004    12/09/18 0145  ceFEPIme in dextrose 5% 2 gram/50 mL IVPB 2 g      -- IV Every 12 hours (non-standard times) 12/09/18 0040          Antifungals (From admission, onward)      None            Microbiology Results (last 7 days)       Procedure Component Value Units Date/Time    Culture, Body Fluid (Aerobic) w/ GS [[id number]] Collected:  12/09/18 0948    Order Status:  No result Specimen:  Pleural Fluid Updated:  12/09/18 1009    Culture, Body Fluid - Bactec [[id number]] Collected:  12/09/18 0948    Order Status:  Canceled Specimen:  Pleural Fluid Updated:  12/09/18 0949    Gram stain [[id number]] Collected:  12/09/18 0948    Order Status:  Canceled Specimen:  Body Fluid from Pleural Fluid Updated:  12/09/18 0949    Fungus culture [[id number]] Collected:  12/09/18 0948    Order Status:  Sent " Specimen:  Body Fluid from Pleural Fluid Updated:  18    AFB culture (includes stain) [[id number]] Collected:  1848    Order Status:  Sent Specimen:  Body Fluid from Pleural Fluid Updated:  18    Urine culture [[id number]] Collected:  18 2249    Order Status:  No result Specimen:  Urine Updated:  1816    Blood culture (site 2) [[id number]] Collected:  18 2359    Order Status:  Sent Specimen:  Blood Updated:  1814    Narrative:       Collection has been rescheduled by UAD at 2018 23:19 Reason:   Patient has central line.  Collection has been rescheduled by UAD at 2018 23:19 Reason:   Patient has central line.    Blood culture (site 1) [[id number]] Collected:  18    Order Status:  Sent Specimen:  Blood from Peripheral, Antecubital, Left Updated:  18            Indication/Target trough:   Urinary Tract Infection     Hemodialysis:   N/A    Dosing Weight:   Wt Readings from Last 1 Encounters:   18 74.8 kg (164 lb 14.5 oz)       Last Vancomycin dose: N/A   Date/Time given: N/A         Vancomycin level:  No results for input(s): VANCOMYCIN-TROUGH in the last 72 hours.  No results for input(s): VANCOMYCIN, RANDOM in the last 72 hours.    Per Protocol Initial/Adjustments Dosin. Initial/Adjustment Dose: Loading dose = 2250 mg x1, followed by Maintenance dose of 2000  mg q24hr to be given at 12-10-18 at 1100 date/time  2. Vancomycin Trough Level will be drawn on 18 at 1000 date/time    Pharmacy will continue to follow.    Please contact if you have any further questions. Thank you.    Renato Manriquez, PharmD  [phone]

## 2018-12-09 NOTE — SUBJECTIVE & OBJECTIVE
History reviewed. No pertinent past medical history.    History reviewed. No pertinent surgical history.    Review of patient's allergies indicates:   Allergen Reactions    Pcn [penicillins] Hives       No current facility-administered medications on file prior to encounter.      Current Outpatient Medications on File Prior to Encounter   Medication Sig    oxycodone HCl/acetaminophen (PERCOCET ORAL) Take by mouth.    traMADol (ULTRAM) 50 mg tablet Take 1 tablet (50 mg total) by mouth every 6 (six) hours as needed for Pain.     Family History     None        Tobacco Use    Smoking status: Never Smoker    Smokeless tobacco: Never Used   Substance and Sexual Activity    Alcohol use: Yes     Alcohol/week: 12.6 oz     Types: 21 Cans of beer per week    Drug use: No    Sexual activity: Not on file     Review of Systems   Unable to perform ROS: intubated     Objective:     Vital Signs (Most Recent):  Temp: 98.4 °F (36.9 °C) (12/09/18 1145)  Pulse: (!) 132 (12/09/18 1300)  Resp: (!) 24 (12/09/18 1300)  BP: (!) 76/55 (12/09/18 1200)  SpO2: 100 % (12/09/18 1300) Vital Signs (24h Range):  Temp:  [98 °F (36.7 °C)-99 °F (37.2 °C)] 98.4 °F (36.9 °C)  Pulse:  [103-193] 132  Resp:  [19-65] 24  SpO2:  [31 %-100 %] 100 %  BP: ()/() 76/55  Arterial Line BP: (68-98)/(57-77) 83/63     Weight: 74.8 kg (164 lb 14.5 oz)  Body mass index is 23.66 kg/m².    SpO2: 100 %  O2 Device (Oxygen Therapy): ventilator      Intake/Output Summary (Last 24 hours) at 12/9/2018 1306  Last data filed at 12/9/2018 1300  Gross per 24 hour   Intake 2674.32 ml   Output 1434 ml   Net 1240.32 ml       Lines/Drains/Airways     Central Venous Catheter Line                 Percutaneous Central Line Insertion/Assessment - triple lumen  12/08/18 2318 right internal jugular less than 1 day          Drain                 NG/OG Tube 12/09/18 0200 [facility] mouth less than 1 day         Urethral Catheter 12/08/18 2231 less than 1 day           Airway                 Airway - Non-Surgical 12/09/18 0021 Endotracheal Tube less than 1 day          Arterial Line                 Arterial Line 12/09/18 0751 Left Radial less than 1 day          Peripheral Intravenous Line                 Peripheral IV - Single Lumen 12/08/18 1729 Right Antecubital less than 1 day         Peripheral IV - Single Lumen 12/08/18 1751 Right Hand less than 1 day         Peripheral IV - Single Lumen 12/08/18 2105 Right Forearm less than 1 day                Physical Exam   Constitutional: He is oriented to person, place, and time. He appears well-developed and well-nourished. He has a sickly appearance. He is intubated.   HENT:   Head: Normocephalic and atraumatic.   Right Ear: External ear normal.   Left Ear: External ear normal.   Mouth/Throat: Oropharynx is clear and moist.   Eyes: Conjunctivae and EOM are normal. Pupils are equal, round, and reactive to light. Right eye exhibits no discharge. Left eye exhibits no discharge. No scleral icterus.   Neck: Normal range of motion. Neck supple. Normal carotid pulses, no hepatojugular reflux and no JVD present. Carotid bruit is not present. No tracheal deviation present. No thyromegaly present.   Cardiovascular: S1 normal and S2 normal. An irregularly irregular rhythm present.  No extrasystoles are present. Tachycardia present. PMI is not displaced. Exam reveals no gallop, no S3, no distant heart sounds, no friction rub and no midsystolic click.   No murmur heard.  Pulses:       Carotid pulses are 2+ on the right side, and 2+ on the left side.       Radial pulses are 2+ on the right side, and 2+ on the left side.        Femoral pulses are 2+ on the right side, and 2+ on the left side.       Popliteal pulses are 2+ on the right side, and 2+ on the left side.        Dorsalis pedis pulses are 1+ on the right side, and 1+ on the left side.        Posterior tibial pulses are 1+ on the right side, and 1+ on the left side.   Pulmonary/Chest:  Effort normal. No accessory muscle usage or stridor. No apnea, no tachypnea and no bradypnea. He is intubated. No respiratory distress. He has decreased breath sounds in the right lower field, the left upper field, the left middle field and the left lower field. He has no wheezes. He has no rales. He exhibits no tenderness and no bony tenderness.   Abdominal: He exhibits no distension, no pulsatile liver, no abdominal bruit, no ascites, no pulsatile midline mass and no mass. There is no tenderness. There is no rebound and no guarding.   Musculoskeletal: He exhibits no edema or tenderness.   Lymphadenopathy:     He has no cervical adenopathy.   Neurological: He is alert and oriented to person, place, and time. He has normal reflexes. No cranial nerve deficit. Coordination normal.   Skin: Skin is warm. No rash noted. No erythema. No pallor.   Psychiatric:   Intubated        Significant Labs: see above as well      LABS  CBC  Recent Labs   Lab 12/08/18  1700 12/09/18  0417   WBC 13.95* 11.05   RBC 3.33* 2.85*   HGB 11.7* 10.0*   HCT 36.0* 29.9*    153   * 105*   MCH 35.1* 35.1*   MCHC 32.5 33.4     BMP  Recent Labs   Lab 12/09/18  0434 12/09/18  0829 12/09/18  1013   * 131* 130*   K 3.7 3.5 3.3*   CO2 18* 15* 15*    104 103   BUN 13 14 14   CREATININE 1.2 1.2 1.2   * 231* 224*       POCT-Glucose  No results found for: POCTGLUCOSE    Recent Labs   Lab 12/09/18  0434 12/09/18  0829 12/09/18  1013   CALCIUM 6.5* 6.7* 6.7*   MG 0.9* 1.8 1.6   PHOS 4.6* 4.0  --      LFT  Recent Labs   Lab 12/09/18  0434 12/09/18  0829 12/09/18  1013   PROT 5.2* 5.1* 5.1*   ALBUMIN 1.5* 1.4* 1.4*   BILITOT 1.1* 1.0 1.1*   AST 54* 62* 59*   ALKPHOS 132 141* 153*   ALT 20 19 19     GFR     COAGS  Recent Labs   Lab 12/08/18  1700   INR 1.6*   APTT 37.7*     CE  Recent Labs   Lab 12/08/18  2359 12/09/18  0829 12/09/18  1013   TROPONINI 0.427* 0.528* 0.585*     ABGs  Recent Labs   Lab 12/09/18  0827   PH 7.394    PCO2 23.6*   PO2 85   HCO3 14.4*   POCSATURATED 97   BE -10     BNP  Recent Labs   Lab 12/08/18  1700   BNP 4,248*       LAST HbA1c  Lab Results   Component Value Date    HGBA1C 5.3 12/09/2018         Significant Imaging:     Imaging Results          CTA Chest Non-Coronary (PE Study) (Final result)  Result time 12/08/18 20:32:35    Final result by Jacinda Moody MD (12/08/18 20:32:35)                 Impression:      1. No evidence of PE.  2. Moderate bilateral pleural effusions, left greater than right, resulting in near complete collapse of the left lower lobe and partial collapse of the left upper and right lower lobes.  3. Small lobular liver with small to moderate volume ascites seen within the upper abdomen.  Correlate for clinical history of cirrhosis.  4. Large hiatal hernia.      Electronically signed by: Jacinda Moody MD  Date:    12/08/2018  Time:    20:32             Narrative:    EXAMINATION:  CTA CHEST NON CORONARY    CLINICAL HISTORY:  Dyspnea, cardiac origin suspected;    TECHNIQUE:  Low dose axial images, sagittal and coronal reformations were obtained from the thoracic inlet to the lung bases following the IV administration of 100 mL of Omnipaque 350.  Contrast timing was optimized to evaluate the pulmonary arteries.  MIP images were performed.    COMPARISON:  None    FINDINGS:  Structures at the base of the neck are unremarkable.  Aorta is non-aneurysmal.  Heart is enlarged without pericardial effusion.  No intraluminal filling defects within the pulmonary arteries to suggest pulmonary thromboembolism.   There is no evidence of mediastinal, axillary, or hilar lymph node enlargement.  The esophagus maintains a normal course and caliber.    There is tracheobronchomalacia which could relate to expiratory phase of imaging.  Moderate bilateral pleural effusions are visualized, left greater than right.  This results in near complete collapse of the left lower lobe and partial collapse of the left  upper and right lower lobes.    Liver is small and somewhat lobular in contour.  Small to moderate volume ascites is seen in the upper abdomen.  Large hiatal hernia is seen.  Remote healed bilateral rib fractures are seen.  Motion artifact is seen involving the lower sternum.  Suspected partially visualize remote right humeral shaft fracture is also seen.  Extrathoracic soft tissues are unremarkable.                               X-Ray Chest 1 View (Final result)  Result time 12/08/18 17:28:06    Final result by Jacinda Moody MD (12/08/18 17:28:06)                 Impression:      Small to moderate volume left pleural effusion with suspected left basilar atelectasis.  Possible mild pulmonary edema.  Potential superimposed consolidative process such as pneumonia difficult to exclude in the left lower lobe.      Electronically signed by: Jacinda Moody MD  Date:    12/08/2018  Time:    17:28             Narrative:    EXAMINATION:  XR CHEST 1 VIEW    CLINICAL HISTORY:  shortness of breath;    TECHNIQUE:  Single frontal view of the chest was performed.    COMPARISON:  07/12/2018.    FINDINGS:  Heart is enlarged but stable in size.  There is small to moderate volume left pleural effusion with suspected left basilar atelectasis.  Possible mild pulmonary edema as well as possible small effusion on the right.  No evidence of pneumothorax.  No acute osseous abnormality identified.  Remote healed rib fractures are seen.

## 2018-12-09 NOTE — PLAN OF CARE
Problem: Patient Care Overview  Goal: Plan of Care Review  Outcome: Ongoing (interventions implemented as appropriate)  Plan of care reviewed with patient; pt somewhat sedated, but nodded appropriately during explanation. Pt turned q 2 hours and heels floated on pillow. Drips titrated per order- see flowsheets.

## 2018-12-09 NOTE — HOSPITAL COURSE
12/8/2018 Presented to the ER with complaints of SOB with desaturation to the 60s. Placed on BiPap with no major improvement prompting intubation. CTA done with no evidence of PE but evidence of bilateral pleural effusions. Pulmonary consulted with 1.1 liters removed from left lung. Afib with RVR and placed on IV Cardizem with severe hypotension requiring discontinuation and pressor initiation. Started on IV Amiodarone. Admitted to ICU under care of [facility]     12/9/2018 Cardiology consulted for afib with RVR and concern for cardiogenic shock. Chart reviewed with following lab findings:   H/H 11.7/36 plt 214  WBC 13.9    Bun/cr 14/1.2  K 3.3  Serum CO2 15  Lactic acid 5.1 to 3.3 this am  BNP 4248    TNI 0.0585  TB 1.1  INR 1.6  Ca 6.8  Alk phos 153  AST 59  ALT nl     UA + Ni/Le/blood  UA > 50 RBCs, > 100 WBCs, many bacteria  CT massive bl pleural effusion L >> R  ETOH + Toxic screen -negative     Recommendation for aggressive diuresis along with echocardiogram for evaluation of LVEF. Nephrology consulted with no significant response to IV Lasix drip prompting initation of CRRT. In addition to cardiogenic shock, concern for septic shock as well  12/10/18 Remains intubated on Precedex and Fentanyl. On Levophed as well as IV Amiodarone with continued afib with HR 100s-110s. Total of 3.1 liters out overnight but remains positive 1.2 liters since admission. Blood cultures with prelim results GNR. Echocardiogram with reduced LV function with EF 25%, moderate TR, severe LAE, mild MR and PHTN with PA pressure 41.94. Creatinine .8 K+ 3.4 troponin .585 lactic acid down to 2.9  12/11/2018 Remains intubated and sedated. HR currently 110s with HR up to 180s and hypotension with agitation. Started on IV Fentanyl as well as Precedex. Remains on Levophed as well as Amiodarone. ON CRRT with 3.3 liters out overnight and negative 716 for 24 hours but remains positive 334 since admission. CVP currently 7. Discussion with  daughter per primary team yesterday and patient made DNR yesterday afternoon. Continued supportive care with no plans for escalation of care per bedside RN this AM

## 2018-12-09 NOTE — CONSULTS
Pulmonary & Critical Care Medicine   Consultation Note    Reason for Consultation: acute respiratory failure with b/l pleural effusions    HPI: 69 y/o Male with no known PMH who presented to the ED after his roommate called EMS for ongoing back pain and persistent SOB and cough. When EMS arrived, pt reportedly saturating 60s, which improved to 90% in the ED. In the ED, pt had CXR showing b/l pleural effusions, worse on left. He had CT PE, which didn't show any pe but moderate left sided pleural effusion and small right sided pleural effusion. Pt also noted to have a UTI and started on abx. While on the floor, pt had increased WOB and was placed on bipap. He was eventually intubated for increased work of breathing. Hospital course was complicated by hypotension/shock and he was started on NE. Additionally, pt noted to be in Afib w/ rvr and started on amiodarone drip. Pulmonary consulted for thoracentesis.      History reviewed. No pertinent past medical history.  History reviewed. No pertinent surgical history.  Social History:   Social History     Socioeconomic History    Marital status: Single     Spouse name: Not on file    Number of children: Not on file    Years of education: Not on file    Highest education level: Not on file   Social Needs    Financial resource strain: Not on file    Food insecurity - worry: Not on file    Food insecurity - inability: Not on file    Transportation needs - medical: Not on file    Transportation needs - non-medical: Not on file   Occupational History    Not on file   Tobacco Use    Smoking status: Never Smoker    Smokeless tobacco: Never Used   Substance and Sexual Activity    Alcohol use: Yes     Alcohol/week: 12.6 oz     Types: 21 Cans of beer per week    Drug use: No    Sexual activity: Not on file   Other Topics Concern    Not on file   Social History Narrative    Not on file     History reviewed. No pertinent family history.  Drug Allergies:   Review of  patient's allergies indicates:   Allergen Reactions    Pcn [penicillins] Hives     Current Infusions:   amiodarone in dextrose 5% 0.5 mg/min (12/09/18 1500)    dexmedetomidine (PRECEDEX) infusion 1.4 mcg/kg/hr (12/09/18 1500)    fentaNYL citrate (PF)-0.9%NaCl 50 mcg/hr (12/09/18 1500)    furosemide (LASIX) 2 mg/mL infusion (titrating) 10 mg/hr (12/09/18 1500)    norepinephrine bitartrate-D5W 0.4 mcg/kg/min (12/09/18 1500)     Scheduled Medications:     calcium gluconate IVPB  1 g Intravenous Once    ceFEPime (MAXIPIME) IVPB  2 g Intravenous Q12H    enoxaparin  40 mg Subcutaneous Daily    pantoprazole  40 mg Intravenous Daily     PRN Medications:   fentaNYL **FOLLOWED BY** [START ON 12/14/2018] fentaNYL    Review of Systems:   A comprehensive 12-point review of systems was performed, and is negative except for those items mentioned above in the HPI section of this note.     Vital Signs:    Vitals:    12/09/18 1540   BP:    Pulse: (!) 130   Resp: (!) 24   Temp:        Fluid Balance:     Intake/Output Summary (Last 24 hours) at 12/9/2018 1559  Last data filed at 12/9/2018 1500  Gross per 24 hour   Intake 2724.32 ml   Output 1464 ml   Net 1260.32 ml       Physical Exam:   General: pt intubated, sedated  HEENT: AT/NC, PERRL, EOMI, nasal and oral mucosa moist. Normal dentition. Oropharynx without exudate.   Neck: Supple without JVD. Trachea midline. Thyroid feels normal.   Cardiac: irregular rate and rhythm with no MRG. Cold extremities.  Respiratory: Normal inspection. Symmetric chest rise. No increased work of breathing noted. Diminished lung sounds in anterior lung fields. + tachypnea  Abdomen: Soft, NT/ND. +BS. No palpable masses. No hepatosplenomegaly.   Lymphatic: No palpable supraclavicular or cervical LAD.   Extremities: Normal strength and tone (grossly). No visible atrophy. No clubbing or cyanosis on nail exam.   Skin: Warm and dry without visible rash.   Neuro: pt intubated/sedated    Personal Review  and Summary of Prior Diagnostics    Laboratory Studies:   Recent Labs   Lab 12/09/18  0827   PH 7.394   PCO2 23.6*   PO2 85   HCO3 14.4*   POCSATURATED 97   BE -10     Recent Labs   Lab 12/09/18  0417   WBC 11.05   RBC 2.85*   HGB 10.0*   HCT 29.9*      *   MCH 35.1*   MCHC 33.4     Recent Labs   Lab 12/09/18  1013 12/09/18  1457   * 126*   K 3.3* 4.8    101   CO2 15* 14*   BUN 14 14   CREATININE 1.2 1.2   MG 1.6  --        Microbiology Data:   Microbiology Results (last 7 days)     Procedure Component Value Units Date/Time    Culture, Body Fluid (Aerobic) w/ GS [[id number]] Collected:  12/09/18 0948    Order Status:  Completed Specimen:  Pleural Fluid Updated:  12/09/18 1307     Gram Stain Result Cytospin indicates:      Few WBC's      No organisms seen    Fungus culture [[id number]] Collected:  12/09/18 0948    Order Status:  Sent Specimen:  Body Fluid from Pleural Fluid Updated:  12/09/18 1152    AFB culture (includes stain) [[id number]] Collected:  12/09/18 0948    Order Status:  Sent Specimen:  Body Fluid from Pleural Fluid Updated:  12/09/18 1152    Blood culture (site 2) [[id number]] Collected:  12/08/18 2009    Order Status:  Completed Specimen:  Blood Updated:  12/09/18 1145     Blood Culture, Routine No Growth to date    Narrative:       Site # 2, aerobic only  Collection has been rescheduled by UAD at 12/08/2018 23:19 Reason:   Patient has central line.  Collection has been rescheduled by UAD at 12/08/2018 23:19 Reason:   Patient has central line.    Culture, Body Fluid - Bactec [[id number]] Collected:  12/09/18 0948    Order Status:  Canceled Specimen:  Pleural Fluid Updated:  12/09/18 0949    Gram stain [[id number]] Collected:  12/09/18 0948    Order Status:  Canceled Specimen:  Body Fluid from Pleural Fluid Updated:  12/09/18 0949    Urine culture [[id number]] Collected:  12/08/18 2249    Order Status:  No result Specimen:  Urine Updated:  12/09/18 0516    Blood culture (site 1)  [[id number]] Collected:  12/08/18 2133    Order Status:  Sent Specimen:  Blood from Peripheral, Antecubital, Left Updated:  12/08/18 2134        Summary of Chest Imaging Personally Reviewed:     2D Echo: pending  - bedside echo done by us, unofficially looks hypodynamic with low EF, probably around 20%    US abd:   Cholelithiasis without sonographic evidence of cholecystitis.  Ascites, right pleural effusion, and additional findings as above.    CTA Chest:   1. No evidence of PE.  2. Moderate bilateral pleural effusions, left greater than right, resulting in near complete collapse of the left lower lobe and partial collapse of the left upper and right lower lobes.  3. Small lobular liver with small to moderate volume ascites seen within the upper abdomen.  Correlate for clinical history of cirrhosis.  4. Large hiatal hernia.    Impression & Recommendations    Systems-Based Impression & Recommendations    Neuro: Pt intubated/sedated with propofol/fentanyl.  - primary team wants to switch to precedex over propofol due to hypotension. Ok to give this a try today  - goal of RASS -1    Cardiovascular/Hemodynamics:  # hypotensive: Suspect due to combination of cardiogenic and septic shock. Pt has features of both.  - currently on NE. If however pt is having increasing NE requirements and is noting to be more tachycardiac and worsening hypotension, should consider dobutamine for cardiogenic shock  - goal of MAP >65    # a fib w/ rvr:   - cards on board. Pt currently on amiodarone gtt  - cant give rate controller due to hypotension  - monitor electrolytes, keeping K+>4 and Mg>2    # HFrEF: Pt has b/l pleural effusions. Thoracentesis likely transudated. Additionally, bedside echo done by us is showing hypokinetic LV function.  - pt currently on lasix gtt but still not diuresing  - primary team made aware. Will consult nephrology  - would recommend starting CRRT for fluid removal      Respiratory:   # acute hypoxemic  respiratory failure: pt has b/l pleural effusions with left side worse than right. Likely due to heart failure  Vent Mode: A/C  Oxygen Concentration (%):  [55-75] 55  Resp Rate Total:  [24 br/min-40 br/min] 24 br/min  Vt Set:  [400 mL] 400 mL  PEEP/CPAP:  [5 cmH20-8 cmH20] 8 cmH20  Mean Airway Pressure:  [8.1 nhW67-44 cmH20] 12 cmH20   - will do spontaneous awakening trial daily    ID:   # septic shock: likely due to UTI with UA grossly abnormal. Liver US shows gallstones but no cholecystitis  - pt on vanc/cefepime  - could d/c vanc and stick with cefepime for likely urinary source  - urine culture pending      Renal:   # acute renal failure?: pt has no known pmh and unsure what his baseline cr is. He however has poor urine output despite lasix gtt  - recommend getting nephrology on board  - may need to start crrt      Heme/Onc: no active issues      GI:  # hepatic cirrhosis: Noted on CT abd. Again no history in our system. Pt does have ascites  - could consider doing a paracentesis to r/o SBP although we do have a source of infection (his UA)    Fluids, Electrolytes, & Nutrition:   May need to start tube feeds tomorrow      Prophylaxis:   VTE--holding AC as he may need trialysis line placed. But would start heparin  PUD--protonix  Early Mobilization--intubated/sedated       Brock Martinez MD PGY-IV  [employer] [facility] Pulmonary/Critical Care Fellow  Pager [phone]

## 2018-12-09 NOTE — ED NOTES
Dr. Garcia informed of pt's heart rate and of ativan administration. No further orders at this time. Will continue to monitor.

## 2018-12-09 NOTE — ED NOTES
Pt displaying pursed lipped breathing, advised to breathe through nose where o2 is. Pt verbalizes understanding and o2 Sat increased to 100 %

## 2018-12-09 NOTE — ED NOTES
Unable to obtain O2 sat, NRBM applied to pt. Dr. Garcia notified, awaiting further orders. Will continue to monitor.

## 2018-12-09 NOTE — ANESTHESIA PROCEDURE NOTES
Arterial    Diagnosis: Hypotension    Patient location during procedure: ICU  Procedure start time: 12/9/2018 7:40 AM  Timeout: 12/9/2018 7:30 AM  Procedure end time: 12/9/2018 7:45 AM  Staffing  Anesthesiologist: Glen Garcia MD  Performed: anesthesiologist   Anesthesiologist was present at the time of the procedure.  Preanesthetic Checklist  Completed: patient identified, site marked, surgical consent, pre-op evaluation, timeout performed, IV checked, risks and benefits discussed, monitors and equipment checked and anesthesia consent givenArterial  Skin Prep: chlorhexidine gluconate  Local Infiltration: none  Orientation: left  Location: radial  Catheter Size: 20 G  Catheter placement by Ultrasound guidance. Heme positive aspiration all ports.  Vessel Caliber: small, patent, compressibility normal  Needle advanced into vessel with real time Ultrasound guidance.Insertion Attempts: 1  Assessment  Dressing: secured with tape and tegaderm, secured with tape and tegaderm  Patient: Tolerated well

## 2018-12-09 NOTE — EICU
New admit    67 y/o M presented with progressive weakness and shortness for the past week accompanied by back pain.  On presentation he was tachycardic and eventually went into afib RVR given diltiazem with drop in BP.  He received 1 liter of fluids in the ED but has since received 160 mg Lasix with no significant improvement in urine output.He was placed on a ventimask for hypoxemia    On transfer to the ICU he was placed on BiPap due to increased work of breathing however persistent of respiratory distress prompted intubation.    Camera assessment:  Patient responsive on propofol    Rate 20  FiO2 75% PEEP 5 Peak pressure 21    Telemetry:  /80  HR 150s on amiodarone drip and levophed    Data:  BNP >4000, Trop I 0.427 from 0.286  Chest CT bilateral effusions L>R  WBC 14, H/H 12/36, plt 214  Na 136, K 4, creatinine 1.2  AST 67, ALT 26  UA WBC >100, bacteria many  ABG 7.299/24/276 on BiPap      · Respiratory failure now intubated, may need therapeutic and diagnostic thoracentesis, likely cardiogenic in etiology in light of significantly elevated BNP, 2 D echo pending  · UTI sepsis with relative volume depletion due to decreased PO intake, given 1 liter in the ED, given cefepime  · afib RVR on amiodarone per cardiology recommendation, likely reactive to sepsis and/or heart failure

## 2018-12-09 NOTE — EICU
In room for thoracentesis. Pt with BP 56/33. Dr Martinez in room levophed increase to 0.35 mcg/kg/min BP improved to 90/42.09:15 1100ccs alivia colored fluid drined from thoracentesis

## 2018-12-09 NOTE — ED NOTES
Report received, pt care assumed. CR monitor on, alarms audible, IVgtt infusing without difficulty. PIV sites without compromise, flush without difficulty. Pt sitting up on stretcher, moderate respiratory distress noted. 's, EKG obtained. Afib RVR noted on EKG. Dr. Garcia updated on pt status.       APPEARANCE: Alert, oriented, moderate respiratory distress noted  CARDIAC: Afib RVR rate 150's noted on CR monitor. BP WNL. Pt denies CP at this time  PERIPHERAL VASCULAR: peripheral pulses +1 and present x4 extremities. Cap refill >3 seconds.. No edema or discoloration. Warm to touch.  RESPIRATORY: moderate distress noted, pt pursed lip breathing, abdominal muscle use noted, RR 30's, breath sounds diminished throughout, expiratory wheezing noted. 2LNC to pt. Pt c/o SOB  GASTRO: soft, bowel sounds normal, no tenderness, no abdominal distention.  G/U: no problems urinating reported  MUSC: Full ROM x4 extremities. No obvious deformity.  SKIN: Skin is warm and dry, loose, mucous membranes moist.  NEURO:  GCS 15, generalized weakness  MENTAL STATUS: AAOx3, calm, cooperative, behavior appropriate to situation  EYE: PERRLA. Bilateral pupil equal, reaction brisk, normal size.   ENT: trachea midline, no problems identified

## 2018-12-09 NOTE — PLAN OF CARE
Problem: Patient Care Overview  Goal: Plan of Care Review  Outcome: Ongoing (interventions implemented as appropriate)    12/09/2018 7:15 AM   Plan of Care   Plan Of Care Reviewed With Safety: call light in reach, patient oriented to room & instructed how to notify nurse if assistance is needed, current questions/concerns addressed, bed in lowest position with wheels locked & side rails up X 3. Pt and family were educated regarding fall precaution and taking appropriate action. Activity: bedrest.  Neurological: MARY ANN Respiratory: ventilator 28/400/75%/5.0, O2 sat WNL.  Cardiac: SBP 80s-130s. Levo infusing currently. HR remains in 120s-130s on amiodarone gtt. Afebrile this shift. Intake/Output: No bm over night.UO decreased to 5ml/hr. MD aware. Fluids ordered and infusing. Diet: NPO maintained as ordered Pain: Nonverbal indicators absent. Skin: one abrasion to RLE. Present upon arrival. LDA: All lines patent and infusing. Plan: TTE in AM. Thoracentesis in AM. Stabilize HR and BP.

## 2018-12-09 NOTE — ASSESSMENT & PLAN NOTE
Pertinent labs:      H/H 11.7/36 plt 214  WBC 13.9      Bun/cr 14/1.2  K 3.3  Serum CO2 15  Lactic acid 5.1 to 3.3 this am      TNI 0.0585  TB 1.1  INR 1.6  Ca 6.8      Alk phos 153  AST 59  ALT nl      UA + Ni/Le/blood  UA > 50 RBCs, > 100 WBCs, many bacteria        CT massive bl pleural effusion L >> R    ETOH + Toxic screen -negative      BNP 4248        Abnormal BNP + TNI + INR + T.B + ascites + massive pleural effusion are concerning for CHF with hepatic congestion. He also has an UTI + leukocytosis + SIRS + acidosis             Rec:        Monitor CVP  Obtain echo in am  Agree with pressors to maintain MAP > 65 for organ perfusion  Amiodarone-afib will improve as SIRS is addressed  Thoracentesis  Antibiotics  Urine, blood, pleural effusion culture  Replace K + Ca++  Vent support  Serial ABG to monitor acidosis         Will continue to follow with you

## 2018-12-10 NOTE — PLAN OF CARE
Problem: Patient Care Overview  Goal: Plan of Care Review  Outcome: Ongoing (interventions implemented as appropriate)  Pt remains on ventilator, sedated, hemodynamically stable on vasopressor, atrial fib on monitor, on amiodarone gtt,  on crrt, maintaining normal temp with blanket warmer, pt receiving ativan for agitation,

## 2018-12-10 NOTE — ASSESSMENT & PLAN NOTE
-afib with RVR upon presentation with hypotension with initiation of IV Cardizem  -placed on IV Amiodarone drip with continue afib although rate somewhat better controlled  -will continue IV Amiodarone drip for now; afib multifactoral and related to stress, infectious and CHF etiology; recurrent RVR with agitation  -as SIRS and CHF improves anticipate improval of HR  -discussed cardioversion with staff; no plans for cardioversion given high likelihood of recurrence with multiple acute issues currently; if acute issues improve and afib persists then will consider cardioversion

## 2018-12-10 NOTE — PROGRESS NOTES
Patient seen and examined by me. I agree with the trainee's separate note except as modified.     Started on ultrafiltration due to inability to diurese. When fluid was pulled, would get more hypotensive and tachycardic.     I/O 1572/2686. Tmin 94.9, HR down to 100s, MPA 84 on 0.3 (now 0.4) norepi. 100% on 40% FiO2.    WBC 11, K 3.4, bicarb 18. Creatinine 0.8. blood cx + GNRs, E coli in urine cx    Pleural fluid labs: glucose 233 (serum 289), , protein 1.2.    7.34/34/111/18/98% on 40%.     On my exam: anasarca still present     Impression: urosepsis with mixed septic and cardiogenic shock.     Plan:   -can stop vanco. Cont zosyn and f/u sensitivities   -I<O as BP allows  -cont current vent settings, wean down FiO2  -cont norepi. Discuss with cardiology regarding dobutamine (difficult with Afib RVR) +/- cardioversion (may not be a durable response  -cont amio  -enoxaparin prophylaxis  -TFs  -search for family    Critical care time (35min) spent personally by me on the following activities: development of treatment plan with patient or surrogate and bedside caregivers, discussions with consultants, evaluation of patient's response to treatment, examination of patient, ordering and performing treatments and interventions, ordering and review of laboratory studies, ordering and review of radiographic studies, pulse oximetry, re-evaluation of patient's condition. This critical care time did not overlap with that of any other provider or involve time for any procedures.

## 2018-12-10 NOTE — PROGRESS NOTES
[facility]  Cardiology  Progress Note    Patient Name: Thee Lane  MRN: [id number]  Admission Date: 12/8/2018  Hospital Length of Stay: 2 days  Code Status: Full Code   Attending Physician: Severyn Yaroshevsky, MD   Primary Care Physician: Mak Wilkins MD  Expected Discharge Date:   Principal Problem:SIRS (systemic inflammatory response syndrome)    Subjective:     Hospital Course:   12/8/2018 Presented to the ER with complaints of SOB with desaturation to the 60s. Placed on BiPap with no major improvement prompting intubation. CTA done with no evidence of PE but evidence of bilateral pleural effusions. Pulmonary consulted with 1.1 liters removed from left lung. Afib with RVR and placed on IV Cardizem with severe hypotension requiring discontinuation and pressor initiation. Started on IV Amiodarone. Admitted to ICU under care of [facility]     12/9/2018 Cardiology consulted for afib with RVR and concern for cardiogenic shock. Chart reviewed with following lab findings:   H/H 11.7/36 plt 214  WBC 13.9    Bun/cr 14/1.2  K 3.3  Serum CO2 15  Lactic acid 5.1 to 3.3 this am  BNP 4248    TNI 0.0585  TB 1.1  INR 1.6  Ca 6.8  Alk phos 153  AST 59  ALT nl     UA + Ni/Le/blood  UA > 50 RBCs, > 100 WBCs, many bacteria  CT massive bl pleural effusion L >> R  ETOH + Toxic screen -negative     Recommendation for aggressive diuresis along with echocardiogram for evaluation of LVEF. Nephrology consulted with no significant response to IV Lasix drip prompting initation of CRRT. In addition to cardiogenic shock, concern for septic shock as well  12/10/18 Remains intubated on Precedex and Fentanyl. On Levophed as well as IV Amiodarone with continued afib with HR 100s-110s. Total of 3.1 liters out overnight but remains positive 1.2 liters since admission. Blood cultures with prelim results GNR. Echocardiogram with reduced LV function with EF 25%, moderate TR, severe LAE, mild MR and PHTN with PA  pressure 41.94. Creatinine .8 K+ 3.4 troponin .585 lactic acid down to 2.9              Review of Systems   Unable to perform ROS: intubated     Objective:     Vital Signs (Most Recent):  Temp: (!) 93.9 °F (34.4 °C) (12/10/18 1110)  Pulse: (!) 119 (12/10/18 1145)  Resp: 20 (12/10/18 1145)  BP: (!) 66/33 (12/10/18 1000)  SpO2: 100 % (12/10/18 1145) Vital Signs (24h Range):  Temp:  [93.3 °F (34.1 °C)-98.2 °F (36.8 °C)] 93.9 °F (34.4 °C)  Pulse:  [] 119  Resp:  [19-32] 20  SpO2:  [93 %-100 %] 100 %  BP: (62-96)/(31-67) 66/33  Arterial Line BP: ()/(57-80) 90/69     Weight: (MARY ANN - bed scale not working)  Body mass index is 23.66 kg/m².     SpO2: 100 %  O2 Device (Oxygen Therapy): ventilator      Intake/Output Summary (Last 24 hours) at 12/10/2018 1222  Last data filed at 12/10/2018 1100  Gross per 24 hour   Intake 2601.86 ml   Output 2785 ml   Net -183.14 ml       Lines/Drains/Airways     Central Venous Catheter Line                 Percutaneous Central Line Insertion/Assessment - triple lumen  12/08/18 2318 right internal jugular 1 day         Trialysis (Dialysis) Catheter 12/09/18 1957 right femoral less than 1 day          Drain                 NG/OG Tube 12/09/18 0200 [facility] mouth 1 day         Urethral Catheter 12/08/18 2231 1 day          Airway                 Airway - Non-Surgical 12/09/18 0021 Endotracheal Tube 1 day          Arterial Line                 Arterial Line 12/09/18 0751 Left Radial 1 day          Peripheral Intravenous Line                 Peripheral IV - Single Lumen 12/08/18 1729 Right Antecubital 1 day         Peripheral IV - Single Lumen 12/08/18 1751 Right Hand 1 day         Peripheral IV - Single Lumen 12/08/18 2105 Right Forearm 1 day                Physical Exam   Constitutional: He has a sickly appearance. He appears ill.   Cardiovascular: An irregularly irregular rhythm present. Tachycardia present.   Pulmonary/Chest: He has decreased breath sounds.   Neurological:    Intubated and sedated    Skin: Skin is warm. There is pallor.       Significant Labs:     Recent Labs   Lab 12/10/18  0513 12/10/18  1024   CALCIUM 7.3*  --    PROT  --  5.4*   *  --    K 3.4*  --    CO2 18*  --      --    BUN 11  --    CREATININE 0.8  --    ALKPHOS  --  128   ALT  --  16   AST  --  59*   BILITOT  --  1.6*     Recent Labs   Lab 12/10/18  0657   WBC 10.95   RBC 3.09*   HGB 11.1*   HCT 31.5*   *   *   MCH 35.9*   MCHC 35.2       Significant Imaging: Echocardiogram:   Transthoracic echo (TTE) complete (Cupid Only):   Results for orders placed or performed during the hospital encounter of 12/08/18   Transthoracic echo (TTE) complete (Cupid Only)   Result Value Ref Range    BSA 1.92 m2    LA WIDTH 3.76 cm    AORTIC VALVE CUSP SEPERATION 1.25 cm    PV PEAK VELOCITY 0.74 cm/s    LVIDD 4.57 3.5 - 6.0 cm    IVS 0.48 (A) 0.6 - 1.1 cm    PW 0.58 (A) 0.6 - 1.1 cm    Ao root annulus 3.54 cm    LVIDS 3.94 2.1 - 4.0 cm    FS 14 28 - 44 %    LA volume 117.16 cm3    LV mass 69.66 g    LA size 5.72 cm    RVDD 3.21 cm    Left Ventricle Relative Wall Thickness 0.25 cm    AV mean gradient 2.14 mmHg    AV index (prosthetic) 0.50     LVOT peak VTI 7.50 cm    Ao peak [place] 1.07 m/s    Ao VTI 15.01 cm    AV peak gradient 4.58 mmHg    TR Max [place] 3.12 m/s    LV Systolic Volume 67.63 mL    LV Systolic Volume Index 35.2 mL/m2    LV Diastolic Volume 95.78 mL    LV Diastolic Volume Index 49.81 mL/m2    LA Volume Index 60.9 mL/m2    LV Mass Index 36.2 g/m2    RA Major Axis 6.34 cm    Left Atrium Minor Axis 6.33 cm    Left Atrium Major Axis 6.49 cm    Triscuspid Valve Regurgitation Peak Gradient 38.94 mmHg    Right Atrial Pressure (from IVC) 3 mmHg    TV rest pulmonary artery pressure 41.94 mmHg   · Severely decreased left ventricular systolic function. The estimated ejection fraction is 25%  · Moderate tricuspid regurgitation.  · Normal right ventricular systolic function.  · Severe left atrial  enlargement.  · Severe atrial enlargement.  · Diastolic pattern consistent with atrial fibrillation observed.  · Mild mitral regurgitation.  · The estimated PA systolic pressure is 41.94 mm Hg Pulmonary hypertension present    Assessment and Plan:     Brief HPI: Seen on AM NP rounds and again on rounds with Dr. Barber. Intubated and sedated. No family at bedside during rounds. Discussed POC with staff as detailed below-agrees with POC     * SIRS (systemic inflammatory response syndrome)    -abnormal BNP + TNI + INR + T.B + ascites + massive pleural effusion are concerning for CHF with hepatic congestion  -also has an UTI + leukocytosis + SIRS + acidosis with GNR on blood cultures   -continue with pressors to maintain MAP > 65 for organ perfusion  -Amiodarone for afib management and anticipate improvement as CHF and SIRS improves      Hepatomegaly    -ascites with total bili 1.6; INR 1.6 on 12/8  -hepatic congestion related to ADHF  -continue with diuresis      CHF (congestive heart failure)    -echo with LVEF 25% and moderate TR  -no ACEI or BB due to hypotension and BB use  -CTA with bilateral pleural effusions; s/p left thoracentesis with 1.1 liters removed  -placed on IVP Lasix followed by Lasix drip with 465cc urine out overnight; placed on CRRT with 2.4 liters out overnight; remains positive 1.2 liters since admission  -needs continued volume removal with CRRT; okay with continued pressor use to allow for volume removal with CRRT       Septic shock    -blood cultures with prelim results with GNR  -on IV abx  -management per primary team      Atrial fibrillation    -afib with RVR upon presentation with hypotension with initiation of IV Cardizem  -placed on IV Amiodarone drip with continue afib although rate somewhat better controlled  -will continue IV Amiodarone drip for now; afib multifactoral and related to stress, infectious and CHF etiology; recurrent RVR with agitation  -as SIRS and CHF improves  anticipate improval of HR  -discussed cardioversion with staff; no plans for cardioversion given high likelihood of recurrence with multiple acute issues currently; if acute issues improve and afib persists then will consider cardioversion          VTE Risk Mitigation (From admission, onward)        Ordered     enoxaparin injection 40 mg  Daily      12/09/18 1137     Place sequential compression device  Until discontinued      12/09/18 0023          CYNTHIA Freeman, ANP  Cardiology  [facility]

## 2018-12-10 NOTE — PROGRESS NOTES
Progress Note  Nephrology      Consult Requested By: Severyn Yaroshevsky, MD      SUBJECTIVE:     Overnight events  Patient is a 68 y.o. male     Patient Active Problem List   Diagnosis    Shortness of breath    SIRS (systemic inflammatory response syndrome)    Atrial fibrillation    Septic shock    Urinary tract infection with hematuria    Acute respiratory failure with hypoxia and hypercapnia    Pleural effusion    Hypokalemia    Hypocalcemia    Other ascites    CHF (congestive heart failure)    Hepatomegaly    Palliative care encounter    Goals of care, counseling/discussion     History reviewed. No pertinent past medical history.           OBJECTIVE:     Vitals:    12/10/18 1110 12/10/18 1115 12/10/18 1130 12/10/18 1145   BP:       Pulse:  (!) 119 (!) 120 (!) 119   Resp:  20 20 20   Temp: (!) 93.9 °F (34.4 °C)      TempSrc: Axillary      SpO2:  100% 100% 100%   Weight:       Height:           Temp: (!) 93.9 °F (34.4 °C) (12/10/18 1110)  Pulse: (!) 119 (12/10/18 1145)  Resp: 20 (12/10/18 1145)  BP: (!) 66/33 (12/10/18 1000)  SpO2: 100 % (12/10/18 1145)    Date 12/10/18 0700 - 12/11/18 0659   Shift 7690-2055 1871-0299 1656-1875 24 Hour Total   INTAKE   I.V.(mL/kg) 383.7(5.1)   383.7(5.1)   IV Piggyback 300   300   Shift Total(mL/kg) 683.7(9.1)   683.7(9.1)   OUTPUT   Urine(mL/kg/hr) 5   5   Other 1052   1052   Shift Total(mL/kg) 1057(14.1)   1057(14.1)   Weight (kg) 74.8 74.8 74.8 74.8             Medications:   ceFEPime (MAXIPIME) IVPB  2 g Intravenous Q12H    enoxaparin  40 mg Subcutaneous Daily    pantoprazole  40 mg Intravenous Daily      sodium chloride 0.9%      amiodarone in dextrose 5% 0.5 mg/min (12/10/18 0524)    dexmedetomidine (PRECEDEX) infusion 1 mcg/kg/hr (12/10/18 1031)    fentaNYL citrate (PF)-0.9%NaCl 50 mcg/hr (12/09/18 1800)    furosemide (LASIX) 2 mg/mL infusion (titrating) Stopped (12/09/18 3334)    norepinephrine bitartrate-D5W 0.5 mcg/kg/min (12/10/18 1222)                Physical Exam:  General appearance:  Lungs:     Heart:   Abdomen:   Extremities:   Skin:  Asterexis      Laboratory:  ABG  Labs reviewed  Recent Results (from the past 336 hour(s))   Basic metabolic panel    Collection Time: 12/09/18  2:57 PM   Result Value Ref Range    Sodium 126 (L) 136 - 145 mmol/L    Potassium 4.8 3.5 - 5.1 mmol/L    Chloride 101 95 - 110 mmol/L    CO2 14 (L) 23 - 29 mmol/L    BUN, Bld 14 8 - 23 mg/dL    Creatinine 1.2 0.5 - 1.4 mg/dL    Calcium 6.5 (LL) 8.7 - 10.5 mg/dL    Anion Gap 11 8 - 16 mmol/L     Recent Results (from the past 336 hour(s))   CBC auto differential    Collection Time: 12/10/18  6:57 AM   Result Value Ref Range    WBC 10.95 3.90 - 12.70 K/uL    Hemoglobin 11.1 (L) 14.0 - 18.0 g/dL    Hematocrit 31.5 (L) 40.0 - 54.0 %    Platelets 103 (L) 150 - 350 K/uL   CBC auto differential    Collection Time: 12/09/18  4:17 AM   Result Value Ref Range    WBC 11.05 3.90 - 12.70 K/uL    Hemoglobin 10.0 (L) 14.0 - 18.0 g/dL    Hematocrit 29.9 (L) 40.0 - 54.0 %    Platelets 153 150 - 350 K/uL   CBC auto differential    Collection Time: 12/08/18  5:00 PM   Result Value Ref Range    WBC 13.95 (H) 3.90 - 12.70 K/uL    Hemoglobin 11.7 (L) 14.0 - 18.0 g/dL    Hematocrit 36.0 (L) 40.0 - 54.0 %    Platelets 214 150 - 350 K/uL     Urinalysis  No results for input(s): COLORU, CLARITYU, SPECGRAV, PHUR, PROTEINUA, GLUCOSEU, BILIRUBINCON, BLOODU, WBCU, RBCU, BACTERIA, MUCUS, NITRITE, LEUKOCYTESUR, UROBILINOGEN, HYALINECASTS in the last 24 hours.    Diagnostic Results:  X-Ray: Reviewed  US: Reviewed  Echo: Reviewed  ACCESS    ASSESSMENT/PLAN:   BELEM   cc/24 h  Fluid balance positive 828 cc since admit   Metabolic acidosis  Hyponatremia 131  Potassium 3.4  Replace  Hypophosphatemia  Replace  Anemia Hb 11.1  Poor nutrition  BP 66/33  On norepinephrine  CVVHD

## 2018-12-10 NOTE — PLAN OF CARE
Problem: Patient Care Overview  Goal: Plan of Care Review  Outcome: Ongoing (interventions implemented as appropriate)  Pt remains intubated and sedated with precedex and fentanyl. Pt awakens to voice and gentle stimulation but when awake, HR goes up to 170's and BP drops. CRRT was started last night and slowly titrated up to orders of  and UF of net neg 100 mL/hr. Pt's BP only able to tolerate net neg 100 mL/hr after receiving 25g of 25% albumin. Pt net neg 630 mL for shift.

## 2018-12-10 NOTE — ASSESSMENT & PLAN NOTE
-echo with LVEF 25% and moderate TR  -no ACEI or BB due to hypotension and BB use  -CTA with bilateral pleural effusions; s/p left thoracentesis with 1.1 liters removed  -placed on IVP Lasix followed by Lasix drip with 465cc urine out overnight; placed on CRRT with 2.4 liters out overnight; remains positive 1.2 liters since admission  -needs continued volume removal with CRRT; okay with continued pressor use to allow for volume removal with CRRT

## 2018-12-10 NOTE — CONSULTS
NEPHROLOGY CONSULT NOTE    HPI & INTERVAL HISTORY:    History reviewed. No pertinent past medical history.   History reviewed. No pertinent surgical history.   Review of patient's allergies indicates:   Allergen Reactions    Pcn [penicillins] Hives      Medications Prior to Admission   Medication Sig Dispense Refill Last Dose    oxycodone HCl/acetaminophen (PERCOCET ORAL) Take by mouth.       traMADol (ULTRAM) 50 mg tablet Take 1 tablet (50 mg total) by mouth every 6 (six) hours as needed for Pain. 12 tablet 0        Social History     Socioeconomic History    Marital status: Single     Spouse name: Not on file    Number of children: Not on file    Years of education: Not on file    Highest education level: Not on file   Social Needs    Financial resource strain: Not on file    Food insecurity - worry: Not on file    Food insecurity - inability: Not on file    Transportation needs - medical: Not on file    Transportation needs - non-medical: Not on file   Occupational History    Not on file   Tobacco Use    Smoking status: Never Smoker    Smokeless tobacco: Never Used   Substance and Sexual Activity    Alcohol use: Yes     Alcohol/week: 12.6 oz     Types: 21 Cans of beer per week    Drug use: No    Sexual activity: Not on file   Other Topics Concern    Not on file   Social History Narrative    Not on file        MEDS   ceFEPime (MAXIPIME) IVPB  2 g Intravenous Q12H    enoxaparin  40 mg Subcutaneous Daily    pantoprazole  40 mg Intravenous Daily                  CONTINOUS INFUSIONS:      Intake/Output Summary (Last 24 hours) at 12/9/2018 1808  Last data filed at 12/9/2018 1531  Gross per 24 hour   Intake 2524.32 ml   Output 1464 ml   Net 1060.32 ml        HEMODYNAMICS:    Temp:  [98 °F (36.7 °C)-98.8 °F (37.1 °C)] 98.1 °F (36.7 °C)  Pulse:  [103-193] 126  Resp:  [19-65] 26  SpO2:  [31 %-100 %] 100 %  BP: ()/() 87/58  Arterial Line BP: (68-98)/(57-77) 86/70   Gen:  sedated  Cards:pulse 100  Pul: diminished breath sounds  On 40 % O2  Abdomen soft   Ext: edema   Skin: dry   LABS   Lab Results   Component Value Date    WBC 11.05 12/09/2018    HGB 10.0 (L) 12/09/2018    HCT 29.9 (L) 12/09/2018     (H) 12/09/2018     12/09/2018        Recent Labs   Lab 12/09/18  1013 12/09/18  1457   * 289*   CALCIUM 6.7* 6.5*   ALBUMIN 1.4*  --    PROT 5.1*  --    * 126*   K 3.3* 4.8   CO2 15* 14*    101   BUN 14 14   CREATININE 1.2 1.2   ALKPHOS 153*  --    ALT 19  --    AST 59*  --    BILITOT 1.1*  --       Lab Results   Component Value Date    CALCIUM 6.5 (LL) 12/09/2018    PHOS 4.0 12/09/2018      No results found for: IRON, TIBC, FERRITIN     ABG  Recent Labs   Lab 12/09/18  1612   PH 7.380   PO2 127*   PCO2 26.9*   HCO3 15.9*   BE -9         IMAGING:  CXR    ASSESSMENT / PLAN  Septic shock  BELEM   cc today  Hyponatremia 126  Metabolic acidosis  Calcium 6.1  Albumin 1.4  Fluid balance positive 1705 cc since admit  Anemia  Hb 10  Acute hypoxemic respiratory failure  CXR-  Cardiac silhouette remains enlarged.  Pulmonary vascularity is not increased.  There has been an interval decrease in the amount of left-sided pleural fluid.  Right lung is stable.  There has been some interval improvement in aeration in the left lung.  No pneumothorax is seen.  Visualized osseous structures are stable with multiple bilateral rib fractures  Atrial fibrillation with RVR  Blood pressure 87/58  On levophed  CVVHD

## 2018-12-10 NOTE — EICU
Elert to bedside for time out for trialysis line placement by Dr. Eliezer Ramachandran. See time out flowsheet.

## 2018-12-10 NOTE — PROGRESS NOTES
TN attempted to perform discharge assessment but pt currently on vent and extremely difficult to arouse. Per nurse, pt had a visitor earlier in the day but they did not have a phone and did not leave any contact information.  Case Mgmt to follow up.

## 2018-12-10 NOTE — ASSESSMENT & PLAN NOTE
-ascites with total bili 1.6; INR 1.6 on 12/8  -hepatic congestion related to ADHF  -continue with diuresis

## 2018-12-10 NOTE — PROGRESS NOTES
Progress Note    Admit Date: 12/8/2018   LOS: 2 days     SUBJECTIVE:       Patient is still intubated and on levophed. He got femoral HD line placed last night and he is on CRRT. He got albumin overnight with did help his pressure. BC grew gram negative rods. No family present or friends present.     Review of Systems   Unable to obtain due to condition       Continuous Infusions:   sodium chloride 0.9%      amiodarone in dextrose 5% 0.5 mg/min (12/10/18 0524)    dexmedetomidine (PRECEDEX) infusion 1 mcg/kg/hr (12/10/18 0601)    fentaNYL citrate (PF)-0.9%NaCl 50 mcg/hr (12/09/18 1800)    furosemide (LASIX) 2 mg/mL infusion (titrating) Stopped (12/09/18 2234)    norepinephrine bitartrate-D5W 0.3 mcg/kg/min (12/10/18 0547)     Scheduled Meds:   albumin human 25%  12.5 g Intravenous Once    ceFEPime (MAXIPIME) IVPB  2 g Intravenous Q12H    enoxaparin  40 mg Subcutaneous Daily    pantoprazole  40 mg Intravenous Daily    potassium chloride in water  10 mEq Intravenous Q1H    potassium phosphate IVPB  30 mmol Intravenous Once     PRN Meds:dextrose 50%, fentaNYL **FOLLOWED BY** [START ON 12/14/2018] fentaNYL, glucagon (human recombinant), insulin aspart U-100, magnesium sulfate IVPB, sodium phosphate IVPB, sodium phosphate IVPB, sodium phosphate IVPB    Review of patient's allergies indicates:   Allergen Reactions    Pcn [penicillins] Hives       OBJECTIVE:     Vital Signs (Most Recent)  Temp: (!) 94.9 °F (34.9 °C) (12/10/18 0330)  Pulse: 104 (12/10/18 0600)  Resp: 20 (12/10/18 0530)  BP: (!) 83/57 (12/10/18 0500)  SpO2: 99 % (12/10/18 0600)    Vital Signs Range (Last 24H):  Temp:  [94.9 °F (34.9 °C)-98.5 °F (36.9 °C)]   Pulse:  []   Resp:  [19-65]   BP: (53-97)/(25-67)   SpO2:  [31 %-100 %]   Arterial Line BP: ()/(57-80)     I & O (Last 24H):    Intake/Output Summary (Last 24 hours) at 12/10/2018 0644  Last data filed at 12/10/2018 0600  Gross per 24 hour   Intake 2587.79 ml   Output 3156 ml   Net  -568.21 ml     Ventilator Data (Last 24H):     Vent Mode: A/C  Oxygen Concentration (%):  [40-65] 40  Resp Rate Total:  [19 br/min-40 br/min] 19 br/min  Vt Set:  [400 mL] 400 mL  PEEP/CPAP:  [5 cmH20-8 cmH20] 8 cmH20  Mean Airway Pressure:  [9 chX34-27 cmH20] 12 cmH20    Physical Exam:    GEN - intubated pale  Oral - MMM; no exudate  CHEST - CTA B ;equal expansion  HEART - rrr; no m/r/s3/s4  ABD - soft; nonttp  EXTR - cold; no edema  NEURO - no asterixis or focal deficits   SKIN - pale weak pulses    Lines/Drains:       Percutaneous Central Line Insertion/Assessment - triple lumen  12/08/18 2318 right internal jugular (Active)   Dressing biopatch in place;dressing dry and intact 12/10/2018  3:15 AM   Securement secured w/ sutures 12/10/2018  3:15 AM   Additional Site Signs no erythema;no warmth;no edema;no pain;no palpable cord;no streak formation;no drainage 12/10/2018  3:15 AM   Distal Patency/Care infusing 12/10/2018  3:15 AM   Medial Patency/Care infusing 12/10/2018  3:15 AM   Proximal Patency/Care infusing 12/10/2018  3:15 AM   Waveform normal 12/10/2018  3:15 AM   Line Interventions line leveled/zeroed 12/10/2018  3:15 AM   Dressing Change Due 12/15/18 12/10/2018  3:15 AM   Daily Line Review Performed 12/10/2018  3:15 AM   Number of days: 1            Trialysis (Dialysis) Catheter 12/09/18 1957 right femoral (Active)   IV Device Securement sutures 12/10/2018  3:15 AM   Additional Site Signs no erythema;no warmth;no edema;no pain;no palpable cord;no streak formation;no drainage 12/10/2018  3:15 AM   Patency/Care flushed w/o difficulty;normal saline locked 12/10/2018  3:15 AM   Site Assessment Clean;Dry;Intact;No redness;No swelling 12/10/2018  3:15 AM   Status Accessed 12/10/2018  3:15 AM   Flows Good 12/10/2018  3:15 AM   Dressing Status Biopatch in place;Clean;Dry;Intact 12/10/2018  3:15 AM   Dressing Change Due 12/16/18 12/10/2018  3:15 AM   Verification by X-ray Yes 12/9/2018  8:00 PM   Site Condition No  complications 12/10/2018  3:15 AM   Dressing Occlusive 12/10/2018  3:15 AM   Daily Line Review Performed 12/10/2018  3:15 AM   Number of days: 0            Peripheral IV - Single Lumen 12/08/18 1729 Right Antecubital (Active)   Site Assessment Clean;Dry;Intact;No redness;No swelling 12/10/2018  3:15 AM   Line Status Flushed;Saline locked 12/10/2018  3:15 AM   Dressing Status Clean;Dry;Intact 12/10/2018  3:15 AM   Dressing Change Due 12/12/18 12/10/2018  3:15 AM   Site Change Due 12/12/18 12/9/2018  7:15 PM   Reason Not Rotated Not due 12/10/2018  3:15 AM   Number of days: 1            Peripheral IV - Single Lumen 12/08/18 1751 Right Hand (Active)   Site Assessment Clean;Dry;Intact;No redness;No swelling 12/10/2018  3:15 AM   Line Status Infusing 12/10/2018  3:15 AM   Dressing Status Clean;Dry;Intact 12/10/2018  3:15 AM   Dressing Change Due 12/12/18 12/10/2018  3:15 AM   Site Change Due 12/12/18 12/9/2018  7:15 PM   Reason Not Rotated Not due 12/10/2018  3:15 AM   Number of days: 1            Peripheral IV - Single Lumen 12/08/18 2105 Right Forearm (Active)   Site Assessment Clean;Dry;Intact;No redness;No swelling 12/10/2018  3:15 AM   Line Status Infusing 12/10/2018  3:15 AM   Dressing Status Clean;Dry;Intact 12/10/2018  3:15 AM   Dressing Change Due 12/12/18 12/10/2018  3:15 AM   Site Change Due 12/12/18 12/9/2018  7:15 PM   Reason Not Rotated Not due 12/10/2018  3:15 AM   Number of days: 1            Arterial Line 12/09/18 0751 Left Radial (Active)   Site Assessment Clean;Dry;Intact;No redness;No swelling 12/10/2018  3:15 AM   Line Status Pulsatile blood flow 12/10/2018  3:15 AM   Art Line Waveform Appropriate;Square wave test performed 12/10/2018  3:15 AM   Arterial Line Interventions Zeroed and calibrated;Leveled;Connections checked and tightened;Flushed per protocol 12/10/2018  3:15 AM   Color/Movement/Sensation Capillary refill less than 3 sec 12/10/2018  3:15 AM   Dressing Type Transparent 12/10/2018  3:15  "AM   Dressing Status Clean;Dry;Intact 12/10/2018  3:15 AM   Dressing Intervention New dressing 12/9/2018  7:51 AM   Dressing Change Due 12/16/18 12/10/2018  3:15 AM   Number of days: 0            NG/OG Tube 12/09/18 0200 [facility] mouth (Active)   Placement Check placement verified by aspirate characteristics;placement verified by aspirate pH 12/10/2018  3:15 AM   pH Aspirate Result 5 12/9/2018  7:15 PM   Distal Tube Length (cm) 65 12/9/2018  3:05 AM   Tolerance no signs/symptoms of discomfort 12/10/2018  3:15 AM   Securement taped to commercial device 12/10/2018  3:15 AM   Clamp Status/Tolerance unclamped 12/10/2018  3:15 AM   Suction Setting/Drainage Method suction at;low;intermittent setting 12/10/2018  3:15 AM   Insertion Site Appearance no redness, warmth, tenderness, skin breakdown, drainage 12/10/2018  3:15 AM   Drainage Clear 12/10/2018  3:15 AM   Flush/Irrigation flushed w/;water;no resistance met 12/10/2018  3:15 AM   Tube Output(mL)(Include Discarded Residual) 100 mL 12/10/2018  6:00 AM   Number of days: 1            Urethral Catheter 12/08/18 2231 (Active)   Site Assessment Clean;Intact 12/10/2018  3:15 AM   Collection Container Urimeter 12/10/2018  3:15 AM   Securement Method secured to top of thigh w/ adhesive device 12/10/2018  3:15 AM   Catheter Care Performed yes 12/10/2018  3:15 AM   Reason for Continuing Urinary Catheterization Critically ill in ICU requiring intensive monitoring 12/10/2018  3:15 AM   CAUTI Prevention Bundle StatLock in place w 1" slack;Intact seal between catheter & drainage tubing;Drainage bag off the floor;Green sheeting clip in use;No dependent loops or kinks;Drainage bag not overfilled (<2/3 full);Drainage bag below bladder 12/9/2018  7:15 PM   Output (mL) 10 mL 12/10/2018  6:00 AM   Number of days: 1       Recent Labs     12/08/18  1700 12/09/18  0417  12/09/18  0434 12/09/18  0829 12/09/18  1013 12/09/18  1457 12/09/18  2315 12/10/18  0513   WBC 13.95* 11.05  --   " --   --   --   --   --   --    HGB 11.7* 10.0*  --   --   --   --   --   --   --    HCT 36.0* 29.9*  --   --   --   --   --   --   --     153  --   --   --   --   --   --   --      --   --  135* 131* 130* 126* 129* 131*   K 4.0  --   --  3.7 3.5 3.3* 4.8 3.4* 3.4*     --   --  104 104 103 101 104 103   CO2 18*  --   --  18* 15* 15* 14* 16* 18*   BUN 11  --   --  13 14 14 14 13 11   CREATININE 1.2  --   --  1.2 1.2 1.2 1.2 1.0 0.8   BILITOT 1.6*  --   --  1.1* 1.0 1.1*  --   --   --    AST 67*  --   --  54* 62* 59*  --   --   --    ALT 26  --   --  20 19 19  --   --   --    ALKPHOS 172*  --   --  132 141* 153*  --   --   --    CALCIUM 6.8*  --   --  6.5* 6.7* 6.7* 6.5* 6.8* 7.3*   ALBUMIN 1.9*  --   --  1.5* 1.4* 1.4*  --  1.2* 2.2*   PROT 6.1  --   --  5.2* 5.1* 5.1*  --   --   --    MG 0.7*  --   --  0.9* 1.8 1.6  --  1.5* 2.9*   PHOS  --   --    < > 4.6* 4.0  --   --  3.3 2.5*    < > = values in this interval not displayed.         ASSESSMENT/PLAN:     Urosepsis  Cardiogenic shock  A fib with rvr  Acute hypoxic respiratory failure intubated  Cirrhosis       Neuro:   - Intubated on precedex/fentanyl     CV    Cardiogenic shock  On levophed   Cardiology following  Goal MAP>65  Lasix ggt but not adequate output  CRRT currently for fluid removal   Dobutamine is an option if pressures do not improve with CRRT   Echo pending     A fib with rvr   Amiodarone drip   Cardiology following  Pulse wnl     Pulm    Acute hypoxemic respiratory failure  Intubated  Pulmonology following   SBT today      FENa/GI    Elevated glucose  SSI  A1c 5.3    Suspected Cirrhosis  Lab Results   Component Value Date    ALT 19 12/09/2018    AST 59 (H) 12/09/2018    ALKPHOS 153 (H) 12/09/2018    BILITOT 1.1 (H) 12/09/2018      IVF/Nutrition/Lytes  -Cont LR, hold on nutrition for now, keep Mg 2, K 4    ID    Septic shock from UTI  -BC growing gram negative rods  -Follow up sensitivities  -Dirty UA  -On cefepime and vanc   -No  other source suspected   -will consider stopping vanc due to gram negative rods and UTI source     Heme    Anemia   Stable  -Trend   -  Recent Labs   Lab 12/08/18  1700 12/09/18  0417   HGB 11.7* 10.0*   HCT 36.0* 29.9*    153   INR 1.6*  --         Nephro  - On CRRT  -nephrology following   - IVF started and will continue to monitor.   -Monitor I/O's, trend Cr, avoid renal toxic meds     Core:  DVT ppx w/ lovenox   SRMB ppx w/ pepcid  HOB elevated 30 degress  Diet: NPO       French Mercer MD  12/10/2018

## 2018-12-10 NOTE — PLAN OF CARE
Problem: Infection (CRRT (Continuous Renal Replacement Therapy))  Goal: Absence of Infection Signs/Symptoms    Intervention: Prevent or Manage Infection   12/09/18 2202   Prevent Infection and Maximize Resistance   Infection Prevention environmental surveillance performed   Prevent or Manage Infection   Infection Management aseptic technique maintained

## 2018-12-10 NOTE — PROGRESS NOTES
Pulmonary & Critical Care Medicine   Consultation Note    Reason for Consultation: acute respiratory failure with b/l pleural effusions    Summary: 69 y/o Male with no known PMH who presented to the ED after his roommate called EMS for ongoing back pain and persistent SOB and cough. When EMS arrived, pt reportedly saturating 60s, which improved to 90% in the ED. In the ED, pt had CXR showing b/l pleural effusions, worse on left. He had CT PE, which didn't show any pe but moderate left sided pleural effusion and small right sided pleural effusion. Pt also noted to have a UTI and started on abx. While on the floor, pt had increased WOB and was placed on bipap. He was eventually intubated for increased work of breathing. Hospital course was complicated by hypotension/shock and he was started on NE. Additionally, pt noted to be in Afib w/ rvr and started on amiodarone drip. Pulmonary consulted for thoracentesis which showed transudative fluid.    Subjective: overnight pt having increasing NE requirements. Pt unable to diurese much due to hypotension and worsening tachycardia.    History reviewed. No pertinent past medical history.  History reviewed. No pertinent surgical history.  Social History:   Social History     Socioeconomic History    Marital status: Single     Spouse name: Not on file    Number of children: Not on file    Years of education: Not on file    Highest education level: Not on file   Social Needs    Financial resource strain: Not on file    Food insecurity - worry: Not on file    Food insecurity - inability: Not on file    Transportation needs - medical: Not on file    Transportation needs - non-medical: Not on file   Occupational History    Not on file   Tobacco Use    Smoking status: Never Smoker    Smokeless tobacco: Never Used   Substance and Sexual Activity    Alcohol use: Yes     Alcohol/week: 12.6 oz     Types: 21 Cans of beer per week    Drug use: No    Sexual activity: Not on  file   Other Topics Concern    Not on file   Social History Narrative    Not on file     History reviewed. No pertinent family history.  Drug Allergies:   Review of patient's allergies indicates:   Allergen Reactions    Pcn [penicillins] Hives     Current Infusions:   sodium chloride 0.9%      amiodarone in dextrose 5% 0.5 mg/min (12/10/18 0524)    dexmedetomidine (PRECEDEX) infusion 1 mcg/kg/hr (12/10/18 1031)    fentaNYL citrate (PF)-0.9%NaCl 50 mcg/hr (12/09/18 1800)    furosemide (LASIX) 2 mg/mL infusion (titrating) Stopped (12/09/18 2234)    norepinephrine bitartrate-D5W 0.5 mcg/kg/min (12/10/18 1222)     Scheduled Medications:     ceFEPime (MAXIPIME) IVPB  2 g Intravenous Q12H    enoxaparin  40 mg Subcutaneous Daily    pantoprazole  40 mg Intravenous Daily     PRN Medications:   dextrose 50%, fentaNYL **FOLLOWED BY** [START ON 12/14/2018] fentaNYL, glucagon (human recombinant), insulin aspart U-100, magnesium sulfate IVPB, sodium phosphate IVPB, sodium phosphate IVPB, sodium phosphate IVPB    Review of Systems:   A comprehensive 12-point review of systems was performed, and is negative except for those items mentioned above in the HPI section of this note.     Vital Signs:    Vitals:    12/10/18 1145   BP:    Pulse: (!) 119   Resp: 20   Temp:        Fluid Balance:     Intake/Output Summary (Last 24 hours) at 12/10/2018 1302  Last data filed at 12/10/2018 1200  Gross per 24 hour   Intake 2571.53 ml   Output 2983 ml   Net -411.47 ml       Physical Exam:   General: pt intubated, sedated  HEENT: AT/NC, PERRL, EOMI, nasal and oral mucosa moist. Normal dentition. Oropharynx without exudate.   Neck: Supple without JVD. Trachea midline. Thyroid feels normal.   Cardiac: irregular rate and rhythm with no MRG. Cold extremities.  Respiratory: Normal inspection. Symmetric chest rise. No increased work of breathing noted. Diminished lung sounds in anterior lung fields. + tachypnea  Abdomen: Soft, NT/ND. +BS. No  palpable masses. No hepatosplenomegaly.   Lymphatic: No palpable supraclavicular or cervical LAD.   Extremities: Normal strength and tone (grossly). No visible atrophy. No clubbing or cyanosis on nail exam. + b/l pitting edema  Skin: Warm and dry without visible rash.   Neuro: pt intubated/sedated    Personal Review and Summary of Prior Diagnostics    Laboratory Studies:   Recent Labs   Lab 12/10/18  0506   PH 7.336*   PCO2 34.0*   PO2 111*   HCO3 18.1*   POCSATURATED 98   BE -8     Recent Labs   Lab 12/10/18  0657   WBC 10.95   RBC 3.09*   HGB 11.1*   HCT 31.5*   *   *   MCH 35.9*   MCHC 35.2     Recent Labs   Lab 12/10/18  0513   *   K 3.4*      CO2 18*   BUN 11   CREATININE 0.8   MG 2.9*       Microbiology Data:   Microbiology Results (last 7 days)     Procedure Component Value Units Date/Time    Blood culture (site 2) [[id number]] Collected:  12/08/18 2642    Order Status:  Completed Specimen:  Blood Updated:  12/10/18 0841     Blood Culture, Routine Gram stain aer bottle: Gram negative rods      Blood Culture, Routine Results called to and read back by: Alisa Mccracken RN  12/09/2018  18:00      Blood Culture, Routine Gram stain anastacio bottle: Gram negative rods      Blood Culture, Routine Positive results previously called 12/09/2018  18:00     Blood Culture, Routine --     GRAM NEGATIVE WILLARD  Identification and susceptibility pending      Narrative:       Site # 2, aerobic only  Collection has been rescheduled by UAD at 12/08/2018 23:19 Reason:   Patient has central line.  Collection has been rescheduled by UAD at 12/08/2018 23:19 Reason:   Patient has central line.    Culture, Body Fluid (Aerobic) w/ GS [[id number]] Collected:  12/09/18 0948    Order Status:  Completed Specimen:  Pleural Fluid Updated:  12/10/18 0735     AEROBIC CULTURE - FLUID No growth     Gram Stain Result Cytospin indicates:      Few WBC's      No organisms seen    Urine culture [[id number]] Collected:  12/08/18 3426     Order Status:  Completed Specimen:  Urine Updated:  12/09/18 2058     Urine Culture, Routine --     PRESUMPTIVE E COLI  >100,000 cfu/ml  Identification and susceptibility pending      Fungus culture [[id number]] Collected:  12/09/18 0948    Order Status:  Sent Specimen:  Body Fluid from Pleural Fluid Updated:  12/09/18 1152    AFB culture (includes stain) [[id number]] Collected:  12/09/18 0948    Order Status:  Sent Specimen:  Body Fluid from Pleural Fluid Updated:  12/09/18 1152    Culture, Body Fluid - Bactec [[id number]] Collected:  12/09/18 0948    Order Status:  Canceled Specimen:  Pleural Fluid Updated:  12/09/18 0949    Gram stain [[id number]] Collected:  12/09/18 0948    Order Status:  Canceled Specimen:  Body Fluid from Pleural Fluid Updated:  12/09/18 0949    Blood culture (site 1) [[id number]] Collected:  12/08/18 2133    Order Status:  Sent Specimen:  Blood from Peripheral, Antecubital, Left Updated:  12/08/18 2134        Summary of Chest Imaging Personally Reviewed:     2D Echo: 12/10/18  · Severely decreased left ventricular systolic function. The estimated ejection fraction is 25%  · Moderate tricuspid regurgitation.  · Normal right ventricular systolic function.  · Severe left atrial enlargement.  · Severe atrial enlargement.  · Diastolic pattern consistent with atrial fibrillation observed.  · Mild mitral regurgitation.  · The estimated PA systolic pressure is 41.94 mm Hg  · Pulmonary hypertension present.      US abd:   Cholelithiasis without sonographic evidence of cholecystitis.  Ascites, right pleural effusion, and additional findings as above.    CTA Chest:   1. No evidence of PE.  2. Moderate bilateral pleural effusions, left greater than right, resulting in near complete collapse of the left lower lobe and partial collapse of the left upper and right lower lobes.  3. Small lobular liver with small to moderate volume ascites seen within the upper abdomen.  Correlate for clinical history of  cirrhosis.  4. Large hiatal hernia.    Impression & Recommendations    Systems-Based Impression & Recommendations    Neuro: Pt intubated/sedated with precedex/fentanyl.  - goal of RASS -1    Cardiovascular/Hemodynamics:  # Cardiogenic shock: Pt likely has cardiogenic and septic shock.   - currently on NE. Very challenging, as we would like to start dobutamine for cardiogenic shock however concern over starting dobutamine over his Afib w/ rvr. Will hold off on dobutamine for now  - goal of MAP >65  - cardiology on board    # a fib w/ rvr: Likely contributing to his hemodynamics (or at least not helping it)  - cards on board. Pt currently on amiodarone gtt  - cant give rate controller due to hypotension  - monitor electrolytes, keeping K+>4 and Mg>2  - ?role of cardioversion but probably not in pt's best interest as he will need anticoagulation and further complications may arise    # HFrEF: Pt has b/l pleural effusions. Thoracentesis was transudated. Echo done 12/10/18 showing EF 25%. Moderate TR, severe LA enlargement, mild MR and pulmonary hypertension  - has CRRT. Will take off fluid slowly due to hypotension    Respiratory:   # acute hypoxemic respiratory failure: pt has b/l pleural effusions with left side worse than right. Due to heart failure.   - will do spontaneous awakening trial daily    ID:   # septic shock: Pt urine showing e. Coli. Blood cultures positive for gram negative rods. Presumed the same source  - continue cefepime.  - sensitivities pending  - will repeat blood culture today to assess for clearance    Renal:   # acute renal failure?: pt has no known pmh and unsure what his baseline cr is. He however has poor urine output despite lasix gtt and was started on crrt  - renal US showed no hydronephrosis, no stones  - bladder scan showed only 30 cc in the bladder  - nephrology on board    Heme/Onc: no active issues    GI:  # hepatic cirrhosis: Noted on CT abd. Family states that he is a chronic  drinker. Goes to the bar every day  - ciwa started    Fluids, Electrolytes, & Nutrition:   - would recommend starting tube feeds today    Had discussion with family today (ex-wife, daughter and son-in-law). Explained pt's hospital course and the current state that he is in. They wish to make pt DNR as they agree that if pt were to have a cardiac arrest, he most likely wouldn't survive without further complications. Will continue medical management.    Prophylaxis:   VTE-- on lovenox  PUD--protonix  Early Mobilization--intubated/sedated       Brock Martinez MD PGY-IV  [employer] [facility] Pulmonary/Critical Care Fellow  Pager [phone]

## 2018-12-10 NOTE — PLAN OF CARE
Aura a close friend of the patient was contacted and she will try to come to the hospital and contact his blood relatives. He has an ex wife who is not in contact with him. She reported that Khris is not a blood relative but lives with him and she will try and contact him. They are aware he is at [facility] in the ICU and someone should come today she reported. ICU nursing station number given to her to call back and give further updates.     French Mercer MD  12/10/2018

## 2018-12-10 NOTE — CONSULTS
"[facility]    CC: cirrhosis    HPI Patient is a 68 y.o. male presents to the ED c/o of SOB x 1 day. Patient reported generalized weakness, coughing, wheezing and back pain x 1 week prior to presentation, however denied any subjective fever, chills, n/v. Per chart check patient has not drank in 5 days which was noted on obtained history, however patient drinks 3-4 beers does so intermittently and has never had symptoms of EtoH withdrawal.      Given positive D-dimer and respiratory distress, a CTA chest showed B/l pleural effusions with collapse of Left and right lower lobe. In the ED, patient went into Afib with RVR. Blood pressure dropped to 80's/50s and patient started on levophed drip. Cards consulted and reccommended Amiodarone drip which will be continued on admission to the ICU. Patient received Lasix 80mg x 1. Mg 2g after Mg level low at 0.7,. Ativan x 2 for anxiety.   Nephrology consulted on 12/9 given low urine output on lasix gtt, CVVH initiated patient only tolerating 100ml/hr after receiving albumin 25g x1, 12.5g x 1    History obtained through chart check as patient is intubated and sedated.    Social History: history of heavy Etoh use; no IVDU   Family History: negative for colon cancer     Review of Systems:   Unable to obtain     Physical Examination  BP (!) 66/33   Pulse (!) 112   Temp (!) 93.3 °F (34.1 °C) (Axillary)   Resp 20   Ht 5' 10" (1.778 m)   Wt 74.8 kg (164 lb 14.5 oz)   SpO2 100%   BMI 23.66 kg/m²   General appearance: intubated, sedated, arouses briefly to stimulation  HENT: Normocephalic, atraumatic, neck symmetrical, no nasal discharge   Eyes: conjunctivae/corneas clear, PERRL, EOM's intact, anicteric sclera  Lungs: intubated clear to auscultation bilaterally  Heart: tachycardic, regular rhythm without rub  Abdomen: soft, non-tender; bowel sounds normoactive; no organomegaly  Extremities: extremities symmetric; no clubbing, cyanosis, or edema  R CVC to groin in place " connected to CVVH machine  Integument: Skin color, texture, turgor normal; no rashes; hair distrubution normal  Neurologic: intubated and sedated  Psychiatric: unable to assess    Labs:  ABG 7.289/31.6/170  PCT 3.13 Lactic acid 5.1  Ca 6.8 Alb 1.9 Mg 0.7  Tbili 1.6    AST 67  Trop 0.286  BNP 4,248  Utox negative    Imaging:  NAVI: No hydronephrosis  KUB: R fem CVC in place  US abd: Cholelithiasis without sonographic evidence of cholecystitis.  Ascites, right pleural effusion. Liver with lobular appearance without focal hepatic lesion in limited study  CTA Chest:   1. No evidence of PE.  2. Moderate bilateral pleural effusions, left greater than right, resulting in near complete collapse of the left lower lobe and partial collapse of the left upper and right lower lobes.  3. Small lobular liver with small to moderate volume ascites seen within the upper abdomen.  Correlate for clinical history of cirrhosis.  4. Large hiatal hernia.  TTE severely decreased LV systolic EF 25%, mod TR, normal RV systolic function, severe LAE, mild MR, pulmonary hypertension present      Assessment: 68 year old man with pmh Etoh use here with likely acute hypoxemic respiratory failure 2/2 bilateral pleural effusions, septic shock 2/2 GNR bacteremia, a fib with RVR on amiodarone gtt, BELEM with reduced urine output on CVVH with suspected cirrhosis with mild-moderate ascites    Ascites likely due to combined picture due to heart failure given elevated BNP, troponin, pleural effusion, and  afib with RVR with component of alcoholic cirrhosis given Etoh history.     Plan:   - Diagnostic paracentesis tomorrow to evaluate albumin, T protein, cell count and cultures   - Continue to treat sepsis 2/2 UTI with cefepime and follow up blood and urine cultures\  - Continue management of hypoxemic respiratory failure per Primary and Pulmonary Teams

## 2018-12-10 NOTE — ASSESSMENT & PLAN NOTE
-abnormal BNP + TNI + INR + T.B + ascites + massive pleural effusion are concerning for CHF with hepatic congestion  -also has an UTI + leukocytosis + SIRS + acidosis with GNR on blood cultures   -continue with pressors to maintain MAP > 65 for organ perfusion  -Amiodarone for afib management and anticipate improvement as CHF and SIRS improves

## 2018-12-10 NOTE — PLAN OF CARE
Pt intubate; no family and per ICU nurse , MD has gone through pt's phone in attempts to contact someone but has been unsuccessful.  TN to continue to follow.     12/10/18 1028   Discharge Assessment   Assessment Type Discharge Planning Assessment

## 2018-12-10 NOTE — SUBJECTIVE & OBJECTIVE
Review of Systems   Unable to perform ROS: intubated     Objective:     Vital Signs (Most Recent):  Temp: (!) 93.9 °F (34.4 °C) (12/10/18 1110)  Pulse: (!) 119 (12/10/18 1145)  Resp: 20 (12/10/18 1145)  BP: (!) 66/33 (12/10/18 1000)  SpO2: 100 % (12/10/18 1145) Vital Signs (24h Range):  Temp:  [93.3 °F (34.1 °C)-98.2 °F (36.8 °C)] 93.9 °F (34.4 °C)  Pulse:  [] 119  Resp:  [19-32] 20  SpO2:  [93 %-100 %] 100 %  BP: (62-96)/(31-67) 66/33  Arterial Line BP: ()/(57-80) 90/69     Weight: (MARY ANN - bed scale not working)  Body mass index is 23.66 kg/m².     SpO2: 100 %  O2 Device (Oxygen Therapy): ventilator      Intake/Output Summary (Last 24 hours) at 12/10/2018 1222  Last data filed at 12/10/2018 1100  Gross per 24 hour   Intake 2601.86 ml   Output 2785 ml   Net -183.14 ml       Lines/Drains/Airways     Central Venous Catheter Line                 Percutaneous Central Line Insertion/Assessment - triple lumen  12/08/18 2318 right internal jugular 1 day         Trialysis (Dialysis) Catheter 12/09/18 1957 right femoral less than 1 day          Drain                 NG/OG Tube 12/09/18 0200 [facility] mouth 1 day         Urethral Catheter 12/08/18 2231 1 day          Airway                 Airway - Non-Surgical 12/09/18 0021 Endotracheal Tube 1 day          Arterial Line                 Arterial Line 12/09/18 0751 Left Radial 1 day          Peripheral Intravenous Line                 Peripheral IV - Single Lumen 12/08/18 1729 Right Antecubital 1 day         Peripheral IV - Single Lumen 12/08/18 1751 Right Hand 1 day         Peripheral IV - Single Lumen 12/08/18 2105 Right Forearm 1 day                Physical Exam   Constitutional: He has a sickly appearance. He appears ill.   Cardiovascular: An irregularly irregular rhythm present. Tachycardia present.   Pulmonary/Chest: He has decreased breath sounds.   Neurological:   Intubated and sedated    Skin: Skin is warm. There is pallor.       Significant  Labs:     Recent Labs   Lab 12/10/18  0513 12/10/18  1024   CALCIUM 7.3*  --    PROT  --  5.4*   *  --    K 3.4*  --    CO2 18*  --      --    BUN 11  --    CREATININE 0.8  --    ALKPHOS  --  128   ALT  --  16   AST  --  59*   BILITOT  --  1.6*     Recent Labs   Lab 12/10/18  0657   WBC 10.95   RBC 3.09*   HGB 11.1*   HCT 31.5*   *   *   MCH 35.9*   MCHC 35.2       Significant Imaging: Echocardiogram:   Transthoracic echo (TTE) complete (Cupid Only):   Results for orders placed or performed during the hospital encounter of 12/08/18   Transthoracic echo (TTE) complete (Cupid Only)   Result Value Ref Range    BSA 1.92 m2    LA WIDTH 3.76 cm    AORTIC VALVE CUSP SEPERATION 1.25 cm    PV PEAK VELOCITY 0.74 cm/s    LVIDD 4.57 3.5 - 6.0 cm    IVS 0.48 (A) 0.6 - 1.1 cm    PW 0.58 (A) 0.6 - 1.1 cm    Ao root annulus 3.54 cm    LVIDS 3.94 2.1 - 4.0 cm    FS 14 28 - 44 %    LA volume 117.16 cm3    LV mass 69.66 g    LA size 5.72 cm    RVDD 3.21 cm    Left Ventricle Relative Wall Thickness 0.25 cm    AV mean gradient 2.14 mmHg    AV index (prosthetic) 0.50     LVOT peak VTI 7.50 cm    Ao peak [place] 1.07 m/s    Ao VTI 15.01 cm    AV peak gradient 4.58 mmHg    TR Max [place] 3.12 m/s    LV Systolic Volume 67.63 mL    LV Systolic Volume Index 35.2 mL/m2    LV Diastolic Volume 95.78 mL    LV Diastolic Volume Index 49.81 mL/m2    LA Volume Index 60.9 mL/m2    LV Mass Index 36.2 g/m2    RA Major Axis 6.34 cm    Left Atrium Minor Axis 6.33 cm    Left Atrium Major Axis 6.49 cm    Triscuspid Valve Regurgitation Peak Gradient 38.94 mmHg    Right Atrial Pressure (from IVC) 3 mmHg    TV rest pulmonary artery pressure 41.94 mmHg   · Severely decreased left ventricular systolic function. The estimated ejection fraction is 25%  · Moderate tricuspid regurgitation.  · Normal right ventricular systolic function.  · Severe left atrial enlargement.  · Severe atrial enlargement.  · Diastolic pattern consistent with atrial  fibrillation observed.  · Mild mitral regurgitation.  · The estimated PA systolic pressure is 41.94 mm Hg Pulmonary hypertension present

## 2018-12-10 NOTE — PLAN OF CARE
Problem: Adult Inpatient Plan of Care  Goal: Plan of Care Review  Outcome: Ongoing (interventions implemented as appropriate)  Recommendation:   1. If pt to remain intubated, initiate TF of Peptamen Intense VHP at 10ml/hr and advance as tolerated to goal rate of 65ml/hr to provide 1560 kcal & 143g protein.    Goals:   TF will be started within 24-48 hours  Nutrition Goal Status: new  Communication of RD Recs: reviewed with RN(Juan)

## 2018-12-10 NOTE — CONSULTS
"  [facility]  Adult Nutrition  Consult Note    SUMMARY     Recommendations    Recommendation:   1. If pt to remain intubated, initiate TF of Peptamen Intense VHP at 10ml/hr and advance as tolerated to goal rate of 65ml/hr to provide 1560 kcal & 143g protein.    Goals:   TF will be started within 24-48 hours  Nutrition Goal Status: new  Communication of RD Recs: reviewed with RN(Juan)    Reason for Assessment  Reason For Assessment: consult(poor nutrition)  Diagnosis: (SIRS)    Nutrition Risk Screen  Nutrition Risk Screen: no indicators present    Nutrition/Diet History  Food Preferences: unable to assess-pt intubated  Factors Affecting Nutritional Intake: NPO    Anthropometrics  Temp: (!) 93.9 °F (34.4 °C)  Height: 5' 10" (177.8 cm)  Height (inches): 70 in  Weight Method: Bed Scale  Weight: 74.8 kg (164 lb 14.5 oz)  Weight (lb): 164.91 lb  Ideal Body Weight (IBW), Male: 166 lb  % Ideal Body Weight, Male (lb): 99.34 lb  BMI (Calculated): 23.7     Lab/Procedures/Meds  Pertinent Labs Reviewed: reviewed  Pertinent Labs Comments: Na 131L, K 3.4L, GLu 173H, Ca 7.3L, Phos 2.5L, Alb 2.2L  Pertinent Medications Reviewed: reviewed  Pertinent Medications Comments: albumin, Lasix    Physical Findings/Assessment  Pt intubated on vent. OG tube in place. Central line. NFPE completed today 12/10. Pt with mild muscle wasting of temple region otherwise appears well nourished.    Estimated/Assessed Needs  Weight Used For Calorie Calculations: 74.8 kg (164 lb 14.5 oz)  Energy Calorie Requirements (kcal): 1642  Energy Need Method: [facility]  Protein Requirements: 90g (1.2g/kg)  Weight Used For Protein Calculations: 74.8 kg (164 lb 14.5 oz)  Estimated Fluid Requirement Method: RDA Method  RDA Method (mL): 1642     Nutrition Prescription Ordered  Nutrition Order Comments: Pt NPO.    Evaluation of Received Nutrient/Fluid Intake  I/O: 2213/4208  Energy Calories Required: not meeting needs  Protein Required: not meeting " needs  Fluid Required: not meeting needs  Comments: No BM recorded  % Intake of Estimated Energy Needs: Other: NPO  % Meal Intake: NPO    Nutrition Risk       Assessment and Plan    * SIRS (systemic inflammatory response syndrome)    Contributing Nutrition Diagnosis  Inadequate energy intake    Related to (etiology):   intubation    Signs and Symptoms (as evidenced by):   NPO    Interventions:  Collaboration with other providers    Nutrition Diagnosis Status:   New            Monitor and Evaluation  Food and Nutrient Intake: energy intake  Food and Nutrient Adminstration: enteral and parenteral nutrition administration  Physical Activity and Function: nutrition-related ADLs and IADLs  Anthropometric Measurements: weight  Biochemical Data, Medical Tests and Procedures: electrolyte and renal panel  Nutrition-Focused Physical Findings: overall appearance     Malnutrition Assessment   Pt with mild muscle wasting of temple region other wise well nourished.      Nutrition Follow-Up  RD Follow-up?: Yes

## 2018-12-10 NOTE — ASSESSMENT & PLAN NOTE
Contributing Nutrition Diagnosis  Inadequate energy intake    Related to (etiology):   intubation    Signs and Symptoms (as evidenced by):   NPO    Interventions:  Collaboration with other providers    Nutrition Diagnosis Status:   New

## 2018-12-10 NOTE — PROCEDURES
Date of procedure: 5/3/17     Preop Dx:  1. Volume overload     Postop Dx:  same     Procedure  R femoral HD catheter placement     Indications:  68 y.o. male with ARDS, CHF, Afib with RVR, and urosepsis picture. Admitted with respiratory distress, desaturations. Pt is having progressive decrease UOP and volume overload as well as hypotension.  Surgery consulted for line placement for CRRT     Procedure in Detail:  Emergent consent      Area was prepped and drapped in the usual sterile fashion. Using US guidance and Seldinger technique a finder needle was used to gain access to the R femoral vein. Wire was passed easily. Tract was dilated and catheter placed. Ports were flushed.     Post Procedure XR obtained     Eliezer Ramachandran MD, PGY4  [facility]  cell: [phone]  pager: [phone]    12/9/2018  8:31 PM

## 2018-12-10 NOTE — CONSULTS
[facility] - Neuroendocrine Surgery Service  Consult Note    Chief Complaint  HD line placement     HPI  Thee Lane is a 68 y.o. male with ARDS, CHF, Afib with RVR, and urosepsis picture. Admitted with respiratory distress, desaturations. Pt is having progressive decrease UOP and volume overload as well as hypotension.    In ICU intubated on pressors and sedated. Unable to obtain history from pt and no family at bedside. I did discuss his case with the nursing staff and primary medical team     ROS  A 10pt ROS was performed and was negative other than in HPI     PMH  History reviewed. No pertinent past medical history.     PSH  History reviewed. No pertinent surgical history.     Medications  No current facility-administered medications on file prior to encounter.      Current Outpatient Medications on File Prior to Encounter   Medication Sig Dispense Refill    oxycodone HCl/acetaminophen (PERCOCET ORAL) Take by mouth.      traMADol (ULTRAM) 50 mg tablet Take 1 tablet (50 mg total) by mouth every 6 (six) hours as needed for Pain. 12 tablet 0        Allergies  Review of patient's allergies indicates:   Allergen Reactions    Pcn [penicillins] Hives        Social History  Social History     Socioeconomic History    Marital status: Single     Spouse name: Not on file    Number of children: Not on file    Years of education: Not on file    Highest education level: Not on file   Social Needs    Financial resource strain: Not on file    Food insecurity - worry: Not on file    Food insecurity - inability: Not on file    Transportation needs - medical: Not on file    Transportation needs - non-medical: Not on file   Occupational History    Not on file   Tobacco Use    Smoking status: Never Smoker    Smokeless tobacco: Never Used   Substance and Sexual Activity    Alcohol use: Yes     Alcohol/week: 12.6 oz     Types: 21 Cans of beer per week    Drug use: No    Sexual activity: Not on file   Other  Topics Concern    Not on file   Social History Narrative    Not on file        Family History  History reviewed. No pertinent family history.     Scheduled Meds:   ceFEPime (MAXIPIME) IVPB  2 g Intravenous Q12H    enoxaparin  40 mg Subcutaneous Daily    pantoprazole  40 mg Intravenous Daily     Continuous Infusions:   sodium chloride 0.9%      amiodarone in dextrose 5% 0.5 mg/min (12/09/18 1800)    dexmedetomidine (PRECEDEX) infusion 1.4 mcg/kg/hr (12/09/18 1800)    fentaNYL citrate (PF)-0.9%NaCl 50 mcg/hr (12/09/18 1800)    furosemide (LASIX) 2 mg/mL infusion (titrating) 10 mg/hr (12/09/18 1800)    norepinephrine bitartrate-D5W 0.25 mcg/kg/min (12/09/18 1800)     PRN Meds:dextrose 50%, fentaNYL **FOLLOWED BY** [START ON 12/14/2018] fentaNYL, glucagon (human recombinant), insulin aspart U-100, magnesium sulfate IVPB, sodium phosphate IVPB, sodium phosphate IVPB, sodium phosphate IVPB     Objective  Temp:  [98 °F (36.7 °C)-98.8 °F (37.1 °C)] 98.1 °F (36.7 °C)  Pulse:  [116-193] 125  Resp:  [19-65] 26  SpO2:  [31 %-100 %] 100 %  BP: ()/() 87/58  Arterial Line BP: (68-98)/(57-77) 84/68     I/O last 3 completed shifts:  In: 3546.9 [I.V.:1146.9; IV Piggyback:2400]  Out: 1679 [Urine:429; Drains:150; Other:1100]  No intake/output data recorded.     Physical Exam  Gen: Intubated and sedated  Resp: intubated  CV: Afib with RVR  Abd: Soft  Ext: edema     Labs  Recent Labs     12/08/18  1700 12/09/18  0417   WBC 13.95* 11.05   HGB 11.7* 10.0*   HCT 36.0* 29.9*    153   INR 1.6*  --      Recent Labs     12/08/18  1700 12/09/18  0434 12/09/18  0829 12/09/18  1013 12/09/18  1457    135* 131* 130* 126*   K 4.0 3.7 3.5 3.3* 4.8    104 104 103 101   CO2 18* 18* 15* 15* 14*   BUN 11 13 14 14 14   CREATININE 1.2 1.2 1.2 1.2 1.2   * 229* 231* 224* 289*   CALCIUM 6.8* 6.5* 6.7* 6.7* 6.5*   MG 0.7* 0.9* 1.8 1.6  --    PHOS  --  4.6* 4.0  --   --    PROT 6.1 5.2* 5.1* 5.1*  --    ALBUMIN  1.9* 1.5* 1.4* 1.4*  --    BILITOT 1.6* 1.1* 1.0 1.1*  --    AST 67* 54* 62* 59*  --    ALKPHOS 172* 132 141* 153*  --    ALT 26 20 19 19  --        Assessment/Plan  69 yo M with what appears to be urosepsis, CHF, ARDS, and volume overload    -will place bedside R femoral trialysis line  -cont care per primary     Eliezer Ramachandran MD, PGY4  [facility]  cell: [phone]  pager: [phone]    12/9/2018  8:28 PM

## 2018-12-11 NOTE — ASSESSMENT & PLAN NOTE
-ascites with total bili 1.6; INR 1.6 on 12/8; repeat total bili 1.6 this AM   -suspicion of cirrhosis as well as hepatic congestion related to ADHF  -will recheck INR in AM

## 2018-12-11 NOTE — PROGRESS NOTES
"[facility]    CC: ascites    HPI 68 y.o. male is admitted with septic and cardiogenic shock; GI consulted for evaluation of acute, mild, painless ascites likely cardiogenic with no association with nausea and vomiting     Interval: CRRT performed overnight. Patient is intubated and continues to be on pressors.     Past medical History:  EtOH abuse      Review of Systems  Unable to obtain 2/2 mental status    Physical Examination  BP (!) 53/29   Pulse (!) 129   Temp 97.6 °F (36.4 °C) (Oral)   Resp 20   Ht 5' 10" (1.778 m)   Wt 80.6 kg (177 lb 11.1 oz)   SpO2 100%   BMI 25.50 kg/m²   General appearance: intubated, sedated  HENT: Normocephalic, atraumatic, neck symmetrical, no nasal discharge, ETT in place  Lungs: on ventilator, no audible wheeze  Heart: tachycardic, irregularly irregular rhythm, palpable pulses  Abdomen: soft, non-tender; non-distended bowel sounds normoactive; no organomegaly  Extremities: extremities symmetric; bilateral lower extremity edema  Neurologic: sedated, unable to answer questions or follow commands    Labs:  H/H 10.4/30.8    Cr 0.7    Imaging:  KUB: non specific bowel gas pattern. R femoral CVC visualized    Personally reviewed above imaging    Assessment:   Mr. Lane is a 68 year old man admitted with septic and cardiogenic shock; GI consulted for evaluation of ascites and cirrhosis. Ascites is likely cardiogenic but we are unable to sample ascitic fluid 2/2 minimal fluid present following CRRT. Patient may have cirrhosis per his daughter, however, work up cirrhosis should be performed as an outpatient once acute issues resolved.     Plan:  - Unable to perform paracentesis to sample ascites as minimal fluid present and unable to safely sample.  - Ascites likely from cardiogenic source  - Outpatient work-up of cirrhosis following recovery of acute illness    Please call with any questions or concerns.    Mee Crowder  [facility]  Cell " [phone]

## 2018-12-11 NOTE — ASSESSMENT & PLAN NOTE
-blood cultures with prelim results with GNR yesterday and evidence of E coli today; repeat cultures from yesterday with prelim results NGTD  -remains on IV abx  -management per primary team   -afebrile with periods of hypothermia requiring warming blanket

## 2018-12-11 NOTE — NURSING
2045: Pt remains Afib 's, on Amio gtt at 0.5 mg/min,  's/70's, on 0.7 of Levo, CVP 9-10, CRRT running - UF 0, lungs coarse, on vent. SpO2 100%, sedated on Precedex and fentanyl. IV electrolyte replacements infusing. Dr. Barber updated on pt status. Informed to  leave Amio at 0.5mg/min, okay with NET 0, keep CVP 8-10.    2120: Updates given to Dr. Christel Aquino. Orders to change blood flow rate to 250 at this time..    2145: Spoke with Dr. Christel Aquino, orders for 12.5g albumin and NET -50 if pt can tolerate.     2350: Critical labs called to Dr. Lewis with Nephrology, new orders for Calcium gluconate 2g IV, infuse over 2 hours.

## 2018-12-11 NOTE — PLAN OF CARE
Problem: Patient Care Overview  Goal: Plan of Care Review  Outcome: Ongoing (interventions implemented as appropriate)  Pt remains intubated, sedated, on CRRT. No acute changes throughout the night. Sedated on Precedex and fentanyl, PRN Ativan for agitation. Hypothermic- warming blanket on. Afib 110-120's, Requiring Levo for MAP 65. SpO2 100% on vent. Lungs coarse, diminished at bases. Chávez in place- 50ml alivia urine. CRRT- 4 K bath, BFR at 250 per MD orders. NET 50, pt tolerated well. Labs q8hr. With PRN replacements. Planning for paracentesis today per GI.

## 2018-12-11 NOTE — ASSESSMENT & PLAN NOTE
-echo with LVEF 25% and moderate TR  -no ACEI or BB due to hypotension and pressor use  -will avoid use of Dobutamine for now given risk of worsening afib with RVR with inotrope use   -s/p left thoracentesis with 1.1 liters removed  -on CRRT with 3.3 liters out overnight; negative 716 in 24 hours but remains positive 334 since admission; CVP currently 7  -CRRT remains in process with UF of 50cc/hr   -concern for ischemic etiology but will continue conservative medical management given acute issues and current DNR status; medical management limited issues (hypotension requiring pressors); recommend ASA and statin if not contraindicated

## 2018-12-11 NOTE — ASSESSMENT & PLAN NOTE
-afib with RVR upon presentation with hypotension with initiation of IV Cardizem  -placed on IV Amiodarone drip with continue afib although rate somewhat better controlled; HR up to 180s with agitation; Fentanyl drip added to Precedex drip yesterday   -afib related to stress etiology as well as infectious etiology; will continue IV Amiodarone drip for now; unable to use BB or CCB due to pressor use   -discussed cardioversion with staff yesterday with no indication given high likelihood of recurrence with multiple acute issues occurring; given overall poor prognosis with high mortality and current DNR status will continue with conservative management and rate control strategy

## 2018-12-11 NOTE — PLAN OF CARE
Patient in ICU, intubated and sedated  Daughter, ex wife and friend at bedside    Patient was living with friend prior to admission    Janett Watson (daughter)  [phone]    Marguerite Argueta (ex wife ) [phone]    Elton Dos Santos (friend)  [phone]       12/11/18 1100   Discharge Assessment   Assessment Type Discharge Planning Assessment   Confirmed/corrected address and phone number on facesheet? Yes   Assessment information obtained from? Other   Prior to hospitilization cognitive status: Alert/Oriented   Prior to hospitalization functional status: Independent;Wheelchair Bound   Current cognitive status: Coma/Sedated/Intubated   Current Functional Status: Completely Dependent   Lives With friend(s)   Is patient able to care for self after discharge? No   Patient's perception of discharge disposition other (comments)   Readmission Within the Last 30 Days no previous admission in last 30 days   Patient currently being followed by outpatient case management? No   Equipment Currently Used at Home wheelchair;cane, straight   Do you have any problems affording any of your prescribed medications? No   Is the patient taking medications as prescribed? no   Discharge Plan A Other   Patient/Family in Agreement with Plan unable to assess     Ita Chavez, RN, CCM, CMSRN  RN Transition Navigator  [phone]

## 2018-12-11 NOTE — PROGRESS NOTES
[facility]  Cardiology  Progress Note    Patient Name: Thee Lane  MRN: [id number]  Admission Date: 12/8/2018  Hospital Length of Stay: 3 days  Code Status: DNR   Attending Physician: Severyn Yaroshevsky, MD   Primary Care Physician: Mak Wilkins MD  Expected Discharge Date:   Principal Problem:Septic shock    Subjective:     Hospital Course:   12/8/2018 Presented to the ER with complaints of SOB with desaturation to the 60s. Placed on BiPap with no major improvement prompting intubation. CTA done with no evidence of PE but evidence of bilateral pleural effusions. Pulmonary consulted with 1.1 liters removed from left lung. Afib with RVR and placed on IV Cardizem with severe hypotension requiring discontinuation and pressor initiation. Started on IV Amiodarone. Admitted to ICU under care of [facility]     12/9/2018 Cardiology consulted for afib with RVR and concern for cardiogenic shock. Chart reviewed with following lab findings:   H/H 11.7/36 plt 214  WBC 13.9    Bun/cr 14/1.2  K 3.3  Serum CO2 15  Lactic acid 5.1 to 3.3 this am  BNP 4248    TNI 0.0585  TB 1.1  INR 1.6  Ca 6.8  Alk phos 153  AST 59  ALT nl     UA + Ni/Le/blood  UA > 50 RBCs, > 100 WBCs, many bacteria  CT massive bl pleural effusion L >> R  ETOH + Toxic screen -negative     Recommendation for aggressive diuresis along with echocardiogram for evaluation of LVEF. Nephrology consulted with no significant response to IV Lasix drip prompting initation of CRRT. In addition to cardiogenic shock, concern for septic shock as well  12/10/18 Remains intubated on Precedex and Fentanyl. On Levophed as well as IV Amiodarone with continued afib with HR 100s-110s. Total of 3.1 liters out overnight but remains positive 1.2 liters since admission. Blood cultures with prelim results GNR. Echocardiogram with reduced LV function with EF 25%, moderate TR, severe LAE, mild MR and PHTN with PA pressure 41.94. Creatinine .8 K+ 3.4 troponin  .585 lactic acid down to 2.9  12/11/2018 Remains intubated and sedated. HR currently 110s with HR up to 180s and hypotension with agitation. Started on IV Fentanyl as well as Precedex. Remains on Levophed as well as Amiodarone. ON CRRT with 3.3 liters out overnight and negative 716 for 24 hours but remains positive 334 since admission. CVP currently 7. Discussion with daughter per primary team yesterday and patient made DNR yesterday afternoon. Continued supportive care with no plans for escalation of care per bedside RN this AM             Review of Systems   Unable to perform ROS: intubated     Objective:     Vital Signs (Most Recent):  Temp: 96.2 °F (35.7 °C) (12/11/18 0715)  Pulse: (!) 118 (12/11/18 1030)  Resp: 20 (12/11/18 1030)  BP: (!) 53/29 (12/11/18 0100)  SpO2: 100 % (12/11/18 1030) Vital Signs (24h Range):  Temp:  [93.9 °F (34.4 °C)-97.6 °F (36.4 °C)] 96.2 °F (35.7 °C)  Pulse:  [108-155] 118  Resp:  [20-51] 20  SpO2:  [86 %-100 %] 100 %  BP: ()/() 53/29  Arterial Line BP: ()/(58-81) 111/81     Weight: 80.6 kg (177 lb 11.1 oz)  Body mass index is 25.5 kg/m².     SpO2: 100 %  O2 Device (Oxygen Therapy): ventilator      Intake/Output Summary (Last 24 hours) at 12/11/2018 1048  Last data filed at 12/11/2018 1000  Gross per 24 hour   Intake 2770.32 ml   Output 3509 ml   Net -738.68 ml       Lines/Drains/Airways     Central Venous Catheter Line                 Percutaneous Central Line Insertion/Assessment - triple lumen  12/08/18 2318 right internal jugular 2 days         Trialysis (Dialysis) Catheter 12/09/18 1957 right femoral 1 day          Drain                 NG/OG Tube 12/09/18 0200 [facility] mouth 2 days         Urethral Catheter 12/08/18 2231 2 days          Airway                 Airway - Non-Surgical 12/09/18 0021 Endotracheal Tube 2 days          Arterial Line                 Arterial Line 12/09/18 0751 Left Radial 2 days          Peripheral Intravenous Line                  Peripheral IV - Single Lumen 12/08/18 1729 Right Antecubital 2 days         Peripheral IV - Single Lumen 12/08/18 2105 Right Forearm 2 days                Physical Exam   Constitutional: He has a sickly appearance. He appears ill. He is intubated.   Cardiovascular: An irregularly irregular rhythm present. Tachycardia present. Exam reveals no gallop.   No murmur heard.  Pulmonary/Chest: Effort normal. He is intubated. He has decreased breath sounds.   Abdominal: Soft.   Neurological:   Intubated and sedated    Skin: Skin is warm and dry. There is pallor.       Significant Labs:     Recent Labs   Lab 12/11/18 0416   *   K 4.0      CO2 20*   BUN 6*   CREATININE 0.7   MG 2.0       Recent Labs   Lab 12/11/18 0416   CALCIUM 7.4*   PROT 5.3*   *   K 4.0   CO2 20*      BUN 6*   CREATININE 0.7   ALKPHOS 121   ALT 14   AST 52*   BILITOT 1.6*     Recent Labs   Lab 12/11/18 0416   WBC 9.95   RBC 2.99*   HGB 10.4*   HCT 30.8*   PLT 82*   *   MCH 34.8*   MCHC 33.8       Significant Imaging: Echocardiogram:   Transthoracic echo (TTE) complete (Cupid Only):   Results for orders placed or performed during the hospital encounter of 12/08/18   Transthoracic echo (TTE) complete (Cupid Only)   Result Value Ref Range    BSA 1.92 m2    LA WIDTH 3.76 cm    AORTIC VALVE CUSP SEPERATION 1.25 cm    PV PEAK VELOCITY 0.74 cm/s    LVIDD 4.57 3.5 - 6.0 cm    IVS 0.48 (A) 0.6 - 1.1 cm    PW 0.58 (A) 0.6 - 1.1 cm    Ao root annulus 3.54 cm    LVIDS 3.94 2.1 - 4.0 cm    FS 14 28 - 44 %    LA volume 117.16 cm3    LV mass 69.66 g    LA size 5.72 cm    RVDD 3.21 cm    Left Ventricle Relative Wall Thickness 0.25 cm    AV mean gradient 2.14 mmHg    AV index (prosthetic) 0.50     LVOT peak VTI 7.50 cm    Ao peak [place] 1.07 m/s    Ao VTI 15.01 cm    AV peak gradient 4.58 mmHg    TR Max [place] 3.12 m/s    LV Systolic Volume 67.63 mL    LV Systolic Volume Index 35.2 mL/m2    LV Diastolic Volume 95.78 mL    LV Diastolic Volume  Index 49.81 mL/m2    LA Volume Index 60.9 mL/m2    LV Mass Index 36.2 g/m2    RA Major Axis 6.34 cm    Left Atrium Minor Axis 6.33 cm    Left Atrium Major Axis 6.49 cm    Triscuspid Valve Regurgitation Peak Gradient 38.94 mmHg    Right Atrial Pressure (from IVC) 3 mmHg    TV rest pulmonary artery pressure 41.94 mmHg   · Severely decreased left ventricular systolic function. The estimated ejection fraction is 25%  · Moderate tricuspid regurgitation.  · Normal right ventricular systolic function.  · Severe left atrial enlargement.  · Severe atrial enlargement.  · Diastolic pattern consistent with atrial fibrillation observed.  · Mild mitral regurgitation.  · The estimated PA systolic pressure is 41.94 mm Hg Pulmonary hypertension present    Assessment and Plan:     Brief HPI: Seen this morning on AM NP rounds. Patient remains intubated and sedated. Reviewed chart from overnight. Brief update per bedside RN. No family at the bedside. Will update family when available on cardiac POC and recommendations     * Septic shock    -blood cultures with prelim results with GNR yesterday and evidence of E coli today; repeat cultures from yesterday with prelim results NGTD  -remains on IV abx  -management per primary team   -afebrile with periods of hypothermia requiring warming blanket      Hepatomegaly    -ascites with total bili 1.6; INR 1.6 on 12/8; repeat total bili 1.6 this AM   -suspicion of cirrhosis as well as hepatic congestion related to ADHF  -will recheck INR in AM      CHF (congestive heart failure)    -echo with LVEF 25% and moderate TR  -no ACEI or BB due to hypotension and pressor use  -will avoid use of Dobutamine for now given risk of worsening afib with RVR with inotrope use   -s/p left thoracentesis with 1.1 liters removed  -on CRRT with 3.3 liters out overnight; negative 716 in 24 hours but remains positive 334 since admission; CVP currently 7  -CRRT remains in process with UF of 50cc/hr   -concern for  ischemic etiology but will continue conservative medical management given acute issues and current DNR status; medical management limited issues (hypotension requiring pressors); recommend ASA and statin if not contraindicated      Atrial fibrillation    -afib with RVR upon presentation with hypotension with initiation of IV Cardizem  -placed on IV Amiodarone drip with continue afib although rate somewhat better controlled; HR up to 180s with agitation; Fentanyl drip added to Precedex drip yesterday   -afib related to stress etiology as well as infectious etiology; will continue IV Amiodarone drip for now; unable to use BB or CCB due to pressor use   -discussed cardioversion with staff yesterday with no indication given high likelihood of recurrence with multiple acute issues occurring; given overall poor prognosis with high mortality and current DNR status will continue with conservative management and rate control strategy        SIRS (systemic inflammatory response syndrome)    -abnormal BNP + TNI + INR + T.B + ascites + massive pleural effusion are concerning for CHF with hepatic congestion  -also has an UTI + leukocytosis + SIRS + acidosis with GNR on blood cultures   -continue with pressors to maintain MAP > 65 for organ perfusion  -Amiodarone for afib management                    VTE Risk Mitigation (From admission, onward)        Ordered     enoxaparin injection 40 mg  Daily      12/09/18 1137     Place sequential compression device  Until discontinued      12/09/18 0023        > 40 minutes was spent in the care of this patient for evaluation, critical thinking and decision making. Also discussion with patient as to present condition. Not all time was spent in direct face to face with patient as time was spent reviewing ECGs, CXR, labs, medications and past studies.    CYNTHIA Freeman, ANP  Cardiology  [facility]

## 2018-12-11 NOTE — PROGRESS NOTES
Progress Note    Admit Date: 12/8/2018   LOS: 3 days     SUBJECTIVE:       Patient is still intubated on pressors and amiodarone with CRRT running. He has systolic blood pressure in the 50's and is tachycardic. He got albumin overnight. BC grew gram ecoli with sensitivities pending. Daughter came yesterday and he is DNR. Echo showed EF 25%.     Review of Systems   Unable to obtain due to condition       Continuous Infusions:   amiodarone in dextrose 5% 0.5 mg/min (12/11/18 0554)    dexmedetomidine (PRECEDEX) infusion 1.2 mcg/kg/hr (12/11/18 0554)    fentaNYL citrate (PF)-0.9%NaCl 50 mcg/hr (12/11/18 0150)    furosemide (LASIX) 2 mg/mL infusion (titrating) Stopped (12/09/18 2234)    norepinephrine bitartrate-D5W 0.65 mcg/kg/min (12/11/18 0555)     Scheduled Meds:   ceFEPime (MAXIPIME) IVPB  2 g Intravenous Q12H    enoxaparin  40 mg Subcutaneous Daily    folic acid  1 mg Per OG tube Daily    multivitamin  1 tablet Per OG tube Daily    pantoprazole  40 mg Intravenous Daily    thiamine  100 mg Per OG tube Daily     PRN Meds:dextrose 50%, fentaNYL **FOLLOWED BY** [START ON 12/14/2018] fentaNYL, glucagon (human recombinant), insulin aspart U-100, lorazepam, magnesium sulfate IVPB    Review of patient's allergies indicates:   Allergen Reactions    Pcn [penicillins] Hives       OBJECTIVE:     Vital Signs (Most Recent)  Temp: 97.6 °F (36.4 °C) (12/11/18 0315)  Pulse: (!) 129 (12/11/18 0645)  Resp: 20 (12/11/18 0645)  BP: (!) 53/29 (12/11/18 0100)  SpO2: 100 % (12/11/18 0645)    Vital Signs Range (Last 24H):  Temp:  [93.3 °F (34.1 °C)-97.6 °F (36.4 °C)]   Pulse:  [102-155]   Resp:  [19-51]   BP: ()/()   SpO2:  [86 %-100 %]   Arterial Line BP: ()/(58-81)     I & O (Last 24H):    Intake/Output Summary (Last 24 hours) at 12/11/2018 0650  Last data filed at 12/11/2018 0600  Gross per 24 hour   Intake 2897.09 ml   Output 3704 ml   Net -806.91 ml     Ventilator Data (Last 24H):     Vent Mode:  A/C  Oxygen Concentration (%):  [30-40] 30  Resp Rate Total:  [20 br/min-24 br/min] 20 br/min  Vt Set:  [400 mL] 400 mL  PEEP/CPAP:  [8 cmH20] 8 cmH20  Mean Airway Pressure:  [12 nxH76-93 cmH20] 12 cmH20    Physical Exam:    GEN - intubated pale  Oral - MMM; no exudate  CHEST - CTA B ;equal expansion  HEART - rrr; no m/r/s3/s4  ABD - soft; nonttp  EXTR - cold; no edema  NEURO - no asterixis or focal deficits   SKIN - pale weak pulses    Lines/Drains:       Percutaneous Central Line Insertion/Assessment - triple lumen  12/08/18 2318 right internal jugular (Active)   Dressing biopatch in place;dressing dry and intact 12/10/2018  3:15 AM   Securement secured w/ sutures 12/10/2018  3:15 AM   Additional Site Signs no erythema;no warmth;no edema;no pain;no palpable cord;no streak formation;no drainage 12/10/2018  3:15 AM   Distal Patency/Care infusing 12/10/2018  3:15 AM   Medial Patency/Care infusing 12/10/2018  3:15 AM   Proximal Patency/Care infusing 12/10/2018  3:15 AM   Waveform normal 12/10/2018  3:15 AM   Line Interventions line leveled/zeroed 12/10/2018  3:15 AM   Dressing Change Due 12/15/18 12/10/2018  3:15 AM   Daily Line Review Performed 12/10/2018  3:15 AM   Number of days: 1            Trialysis (Dialysis) Catheter 12/09/18 1957 right femoral (Active)   IV Device Securement sutures 12/10/2018  3:15 AM   Additional Site Signs no erythema;no warmth;no edema;no pain;no palpable cord;no streak formation;no drainage 12/10/2018  3:15 AM   Patency/Care flushed w/o difficulty;normal saline locked 12/10/2018  3:15 AM   Site Assessment Clean;Dry;Intact;No redness;No swelling 12/10/2018  3:15 AM   Status Accessed 12/10/2018  3:15 AM   Flows Good 12/10/2018  3:15 AM   Dressing Status Biopatch in place;Clean;Dry;Intact 12/10/2018  3:15 AM   Dressing Change Due 12/16/18 12/10/2018  3:15 AM   Verification by X-ray Yes 12/9/2018  8:00 PM   Site Condition No complications 12/10/2018  3:15 AM   Dressing Occlusive 12/10/2018   3:15 AM   Daily Line Review Performed 12/10/2018  3:15 AM   Number of days: 0            Peripheral IV - Single Lumen 12/08/18 1729 Right Antecubital (Active)   Site Assessment Clean;Dry;Intact;No redness;No swelling 12/10/2018  3:15 AM   Line Status Flushed;Saline locked 12/10/2018  3:15 AM   Dressing Status Clean;Dry;Intact 12/10/2018  3:15 AM   Dressing Change Due 12/12/18 12/10/2018  3:15 AM   Site Change Due 12/12/18 12/9/2018  7:15 PM   Reason Not Rotated Not due 12/10/2018  3:15 AM   Number of days: 1            Peripheral IV - Single Lumen 12/08/18 1751 Right Hand (Active)   Site Assessment Clean;Dry;Intact;No redness;No swelling 12/10/2018  3:15 AM   Line Status Infusing 12/10/2018  3:15 AM   Dressing Status Clean;Dry;Intact 12/10/2018  3:15 AM   Dressing Change Due 12/12/18 12/10/2018  3:15 AM   Site Change Due 12/12/18 12/9/2018  7:15 PM   Reason Not Rotated Not due 12/10/2018  3:15 AM   Number of days: 1            Peripheral IV - Single Lumen 12/08/18 2105 Right Forearm (Active)   Site Assessment Clean;Dry;Intact;No redness;No swelling 12/10/2018  3:15 AM   Line Status Infusing 12/10/2018  3:15 AM   Dressing Status Clean;Dry;Intact 12/10/2018  3:15 AM   Dressing Change Due 12/12/18 12/10/2018  3:15 AM   Site Change Due 12/12/18 12/9/2018  7:15 PM   Reason Not Rotated Not due 12/10/2018  3:15 AM   Number of days: 1            Arterial Line 12/09/18 0751 Left Radial (Active)   Site Assessment Clean;Dry;Intact;No redness;No swelling 12/10/2018  3:15 AM   Line Status Pulsatile blood flow 12/10/2018  3:15 AM   Art Line Waveform Appropriate;Square wave test performed 12/10/2018  3:15 AM   Arterial Line Interventions Zeroed and calibrated;Leveled;Connections checked and tightened;Flushed per protocol 12/10/2018  3:15 AM   Color/Movement/Sensation Capillary refill less than 3 sec 12/10/2018  3:15 AM   Dressing Type Transparent 12/10/2018  3:15 AM   Dressing Status Clean;Dry;Intact 12/10/2018  3:15 AM   Dressing  "Intervention New dressing 12/9/2018  7:51 AM   Dressing Change Due 12/16/18 12/10/2018  3:15 AM   Number of days: 0            NG/OG Tube 12/09/18 0200 [facility] mouth (Active)   Placement Check placement verified by aspirate characteristics;placement verified by aspirate pH 12/10/2018  3:15 AM   pH Aspirate Result 5 12/9/2018  7:15 PM   Distal Tube Length (cm) 65 12/9/2018  3:05 AM   Tolerance no signs/symptoms of discomfort 12/10/2018  3:15 AM   Securement taped to commercial device 12/10/2018  3:15 AM   Clamp Status/Tolerance unclamped 12/10/2018  3:15 AM   Suction Setting/Drainage Method suction at;low;intermittent setting 12/10/2018  3:15 AM   Insertion Site Appearance no redness, warmth, tenderness, skin breakdown, drainage 12/10/2018  3:15 AM   Drainage Clear 12/10/2018  3:15 AM   Flush/Irrigation flushed w/;water;no resistance met 12/10/2018  3:15 AM   Tube Output(mL)(Include Discarded Residual) 100 mL 12/10/2018  6:00 AM   Number of days: 1            Urethral Catheter 12/08/18 2231 (Active)   Site Assessment Clean;Intact 12/10/2018  3:15 AM   Collection Container Urimeter 12/10/2018  3:15 AM   Securement Method secured to top of thigh w/ adhesive device 12/10/2018  3:15 AM   Catheter Care Performed yes 12/10/2018  3:15 AM   Reason for Continuing Urinary Catheterization Critically ill in ICU requiring intensive monitoring 12/10/2018  3:15 AM   CAUTI Prevention Bundle StatLock in place w 1" slack;Intact seal between catheter & drainage tubing;Drainage bag off the floor;Green sheeting clip in use;No dependent loops or kinks;Drainage bag not overfilled (<2/3 full);Drainage bag below bladder 12/9/2018  7:15 PM   Output (mL) 10 mL 12/10/2018  6:00 AM   Number of days: 1       Recent Labs     12/09/18  0417  12/09/18  0829 12/09/18  1013  12/10/18  0657 12/10/18  1024 12/10/18  1419 12/10/18  2202 12/11/18  0416   WBC 11.05  --   --   --   --  10.95  --   --   --  9.95   HGB 10.0*  --   --   --   --  " 11.1*  --   --   --  10.4*   HCT 29.9*  --   --   --   --  31.5*  --   --   --  30.8*     --   --   --   --  103*  --   --   --  82*   NA  --    < > 131* 130*   < >  --   --  133* 132* 133*   K  --    < > 3.5 3.3*   < >  --   --  4.1 4.2 4.0   CL  --    < > 104 103   < >  --   --  104 102 103   CO2  --    < > 15* 15*   < >  --   --  21* 20* 20*   BUN  --    < > 14 14   < >  --   --  9 7* 6*   CREATININE  --    < > 1.2 1.2   < >  --   --  0.8 0.7 0.7   BILITOT  --    < > 1.0 1.1*  --   --  1.6*  --   --   --    AST  --    < > 62* 59*  --   --  59*  --   --   --    ALT  --    < > 19 19  --   --  16  --   --   --    ALKPHOS  --    < > 141* 153*  --   --  128  --   --   --    CALCIUM  --    < > 6.7* 6.7*   < >  --   --  7.3* 6.9* 7.4*   ALBUMIN  --    < > 1.4* 1.4*   < >  --  2.4* 2.3* 2.0* 2.5*   PROT  --    < > 5.1* 5.1*  --   --  5.4*  --   --   --    MG  --    < > 1.8 1.6   < >  --   --  2.0 2.2 2.0   PHOS  --    < > 4.0  --    < >  --   --  2.0* 5.9* 3.4    < > = values in this interval not displayed.         ASSESSMENT/PLAN:     Urosepsis with ecoli   Cardiogenic shock  A fib with rvr  Acute hypoxic respiratory failure intubated  Cirrhosis  Alcohol withdrawal ativan prn      Neuro:   - Intubated on precedex/fentanyl     CV    Cardiogenic shock  On levophed   Cardiology following  Goal MAP>65  Lasix ggt but not adequate output  CRRT currently for fluid removal   Dobutamine is an option if pressures do not improve with CRRT   Echo 25%     A fib with rvr   Amiodarone drip   Cardiology following  Pulse wnl     Pulm    Acute hypoxemic respiratory failure  Intubated   Pulmonology following      FENa/GI    Elevated glucose  SSI  A1c 5.3     Suspected Cirrhosis  Heavy drinker    IVF/Nutrition/Lytes  -Cont LR, hold on nutrition for now, keep Mg 2, K 4    ID    Septic shock from UTI  -BC growing gram negative rods ecoli sensitivity pending   -Follow up sensitivities  -Dirty UA  -On cefepime and vanc consider stopping  vanc  -No other source suspected   -will consider stopping vanc due to gram negative rods and UTI source     Heme    Anemia   Stable  -Trend   -  Recent Labs   Lab 12/08/18  1700  12/10/18  0657 12/11/18  0416   HGB 11.7*   < > 11.1* 10.4*   HCT 36.0*   < > 31.5* 30.8*      < > 103* 82*   INR 1.6*  --   --   --     < > = values in this interval not displayed.        Nephro  - On CRRT  -nephrology following   - IVF started and will continue to monitor.   -Monitor I/O's, trend Cr, avoid renal toxic meds     Core:  DVT ppx w/ lovenox   SRMB ppx w/ pepcid  HOB elevated 30 degress  Diet: NPO   DNR      French Mercer MD  12/11/2018

## 2018-12-11 NOTE — PLAN OF CARE
Problem: Patient Care Overview  Goal: Plan of Care Review  Outcome: Ongoing (interventions implemented as appropriate)  Patient on  with documented settings.  Alarms are set and functioning with adequate volumes.  AMBU bag and mask at bedside. Will continue to monitor.

## 2018-12-11 NOTE — PROGRESS NOTES
Patient seen and examined by me. I agree with the trainee's separate note except as modified.     No new events. Still in Afib with RVR.    Tmin 93.3. I/O -800cc. HR 120s, MAP 82 on 0.65 norepi. 100% on 30% FiO2.    WBC 10, bicarb 20. Hep C +. 7.36/36/81/20/95%. Urine cx pan sensitive E coli    On my exam: RASS -3    Impression: Combined septic and cardiogenic shock    Plan:   -wean down FiO2, leave PEEP at 7  -lighten sedation  -cont norepi  -cont to pull fluid as able with CRRT  -off vanc, on cefepime   -DNR per family request. Family meeting to be arranged tomorrow AM    Critical care time (30min) spent personally by me on the following activities: development of treatment plan with patient or surrogate and bedside caregivers, discussions with consultants, evaluation of patient's response to treatment, examination of patient, ordering and performing treatments and interventions, ordering and review of laboratory studies, ordering and review of radiographic studies, pulse oximetry, re-evaluation of patient's condition. This critical care time did not overlap with that of any other provider or involve time for any procedures.

## 2018-12-11 NOTE — SUBJECTIVE & OBJECTIVE
Review of Systems   Unable to perform ROS: intubated     Objective:     Vital Signs (Most Recent):  Temp: 96.2 °F (35.7 °C) (12/11/18 0715)  Pulse: (!) 118 (12/11/18 1030)  Resp: 20 (12/11/18 1030)  BP: (!) 53/29 (12/11/18 0100)  SpO2: 100 % (12/11/18 1030) Vital Signs (24h Range):  Temp:  [93.9 °F (34.4 °C)-97.6 °F (36.4 °C)] 96.2 °F (35.7 °C)  Pulse:  [108-155] 118  Resp:  [20-51] 20  SpO2:  [86 %-100 %] 100 %  BP: ()/() 53/29  Arterial Line BP: ()/(58-81) 111/81     Weight: 80.6 kg (177 lb 11.1 oz)  Body mass index is 25.5 kg/m².     SpO2: 100 %  O2 Device (Oxygen Therapy): ventilator      Intake/Output Summary (Last 24 hours) at 12/11/2018 1048  Last data filed at 12/11/2018 1000  Gross per 24 hour   Intake 2770.32 ml   Output 3509 ml   Net -738.68 ml       Lines/Drains/Airways     Central Venous Catheter Line                 Percutaneous Central Line Insertion/Assessment - triple lumen  12/08/18 2318 right internal jugular 2 days         Trialysis (Dialysis) Catheter 12/09/18 1957 right femoral 1 day          Drain                 NG/OG Tube 12/09/18 0200 [facility] mouth 2 days         Urethral Catheter 12/08/18 2231 2 days          Airway                 Airway - Non-Surgical 12/09/18 0021 Endotracheal Tube 2 days          Arterial Line                 Arterial Line 12/09/18 0751 Left Radial 2 days          Peripheral Intravenous Line                 Peripheral IV - Single Lumen 12/08/18 1729 Right Antecubital 2 days         Peripheral IV - Single Lumen 12/08/18 2105 Right Forearm 2 days                Physical Exam   Constitutional: He has a sickly appearance. He appears ill. He is intubated.   Cardiovascular: An irregularly irregular rhythm present. Tachycardia present. Exam reveals no gallop.   No murmur heard.  Pulmonary/Chest: Effort normal. He is intubated. He has decreased breath sounds.   Abdominal: Soft.   Neurological:   Intubated and sedated    Skin: Skin is warm and  dry. There is pallor.       Significant Labs:     Recent Labs   Lab 12/11/18  0416   *   K 4.0      CO2 20*   BUN 6*   CREATININE 0.7   MG 2.0       Recent Labs   Lab 12/11/18 0416   CALCIUM 7.4*   PROT 5.3*   *   K 4.0   CO2 20*      BUN 6*   CREATININE 0.7   ALKPHOS 121   ALT 14   AST 52*   BILITOT 1.6*     Recent Labs   Lab 12/11/18 0416   WBC 9.95   RBC 2.99*   HGB 10.4*   HCT 30.8*   PLT 82*   *   MCH 34.8*   MCHC 33.8       Significant Imaging: Echocardiogram:   Transthoracic echo (TTE) complete (Cupid Only):   Results for orders placed or performed during the hospital encounter of 12/08/18   Transthoracic echo (TTE) complete (Cupid Only)   Result Value Ref Range    BSA 1.92 m2    LA WIDTH 3.76 cm    AORTIC VALVE CUSP SEPERATION 1.25 cm    PV PEAK VELOCITY 0.74 cm/s    LVIDD 4.57 3.5 - 6.0 cm    IVS 0.48 (A) 0.6 - 1.1 cm    PW 0.58 (A) 0.6 - 1.1 cm    Ao root annulus 3.54 cm    LVIDS 3.94 2.1 - 4.0 cm    FS 14 28 - 44 %    LA volume 117.16 cm3    LV mass 69.66 g    LA size 5.72 cm    RVDD 3.21 cm    Left Ventricle Relative Wall Thickness 0.25 cm    AV mean gradient 2.14 mmHg    AV index (prosthetic) 0.50     LVOT peak VTI 7.50 cm    Ao peak [place] 1.07 m/s    Ao VTI 15.01 cm    AV peak gradient 4.58 mmHg    TR Max [place] 3.12 m/s    LV Systolic Volume 67.63 mL    LV Systolic Volume Index 35.2 mL/m2    LV Diastolic Volume 95.78 mL    LV Diastolic Volume Index 49.81 mL/m2    LA Volume Index 60.9 mL/m2    LV Mass Index 36.2 g/m2    RA Major Axis 6.34 cm    Left Atrium Minor Axis 6.33 cm    Left Atrium Major Axis 6.49 cm    Triscuspid Valve Regurgitation Peak Gradient 38.94 mmHg    Right Atrial Pressure (from IVC) 3 mmHg    TV rest pulmonary artery pressure 41.94 mmHg   · Severely decreased left ventricular systolic function. The estimated ejection fraction is 25%  · Moderate tricuspid regurgitation.  · Normal right ventricular systolic function.  · Severe left atrial  enlargement.  · Severe atrial enlargement.  · Diastolic pattern consistent with atrial fibrillation observed.  · Mild mitral regurgitation.  · The estimated PA systolic pressure is 41.94 mm Hg Pulmonary hypertension present

## 2018-12-12 PROBLEM — J96.02 ACUTE RESPIRATORY FAILURE WITH HYPOXIA AND HYPERCAPNIA: Status: RESOLVED | Noted: 2018-01-01 | Resolved: 2018-01-01

## 2018-12-12 PROBLEM — A41.9 SEPTIC SHOCK: Status: RESOLVED | Noted: 2018-01-01 | Resolved: 2018-01-01

## 2018-12-12 PROBLEM — R16.0 HEPATOMEGALY: Status: RESOLVED | Noted: 2018-01-01 | Resolved: 2018-01-01

## 2018-12-12 PROBLEM — R65.10 SIRS (SYSTEMIC INFLAMMATORY RESPONSE SYNDROME): Status: RESOLVED | Noted: 2018-01-01 | Resolved: 2018-01-01

## 2018-12-12 PROBLEM — J96.01 ACUTE RESPIRATORY FAILURE WITH HYPOXIA AND HYPERCAPNIA: Status: RESOLVED | Noted: 2018-01-01 | Resolved: 2018-01-01

## 2018-12-12 PROBLEM — E83.51 HYPOCALCEMIA: Status: RESOLVED | Noted: 2018-01-01 | Resolved: 2018-01-01

## 2018-12-12 PROBLEM — I48.91 ATRIAL FIBRILLATION: Status: RESOLVED | Noted: 2018-01-01 | Resolved: 2018-01-01

## 2018-12-12 PROBLEM — I50.9 CHF (CONGESTIVE HEART FAILURE): Status: RESOLVED | Noted: 2018-01-01 | Resolved: 2018-01-01

## 2018-12-12 PROBLEM — R65.21 SEPTIC SHOCK: Status: RESOLVED | Noted: 2018-01-01 | Resolved: 2018-01-01

## 2018-12-12 PROBLEM — Z51.5 COMFORT MEASURES ONLY STATUS: Status: ACTIVE | Noted: 2018-01-01

## 2018-12-12 PROBLEM — Z51.5 PALLIATIVE CARE ENCOUNTER: Status: RESOLVED | Noted: 2018-01-01 | Resolved: 2018-01-01

## 2018-12-12 PROBLEM — R18.8 OTHER ASCITES: Status: RESOLVED | Noted: 2018-01-01 | Resolved: 2018-01-01

## 2018-12-12 PROBLEM — R31.9 URINARY TRACT INFECTION WITH HEMATURIA: Status: RESOLVED | Noted: 2018-01-01 | Resolved: 2018-01-01

## 2018-12-12 PROBLEM — E87.6 HYPOKALEMIA: Status: RESOLVED | Noted: 2018-01-01 | Resolved: 2018-01-01

## 2018-12-12 PROBLEM — R06.02 SHORTNESS OF BREATH: Status: RESOLVED | Noted: 2018-01-01 | Resolved: 2018-01-01

## 2018-12-12 PROBLEM — J90 PLEURAL EFFUSION: Status: RESOLVED | Noted: 2018-01-01 | Resolved: 2018-01-01

## 2018-12-12 PROBLEM — Z51.5 COMFORT MEASURES ONLY STATUS: Status: RESOLVED | Noted: 2018-01-01 | Resolved: 2018-01-01

## 2018-12-12 PROBLEM — Z71.89 GOALS OF CARE, COUNSELING/DISCUSSION: Status: RESOLVED | Noted: 2018-01-01 | Resolved: 2018-01-01

## 2018-12-12 PROBLEM — N39.0 URINARY TRACT INFECTION WITH HEMATURIA: Status: RESOLVED | Noted: 2018-01-01 | Resolved: 2018-01-01

## 2018-12-12 NOTE — PROGRESS NOTES
Pulmonary & Critical Care Medicine   Consultation Note    Reason for Consultation: acute respiratory failure with b/l pleural effusions    Summary: 67 y/o Male with no known PMH who presented to the ED after his roommate called EMS for ongoing back pain and persistent SOB and cough. When EMS arrived, pt reportedly saturating 60s, which improved to 90% in the ED. In the ED, pt had CXR showing b/l pleural effusions, worse on left. He had CT PE, which didn't show any pe but moderate left sided pleural effusion and small right sided pleural effusion. Pt also noted to have a UTI and started on abx. While on the floor, pt had increased WOB and was placed on bipap. He was eventually intubated for increased work of breathing. Hospital course was complicated by hypotension/shock and he was started on NE. Additionally, pt noted to be in Afib w/ rvr and started on amiodarone drip. Pulmonary consulted for thoracentesis which showed transudative fluid.    Subjective: Pt had no acute events overnight. This morning he's been having increasing pressor requirements. Pt remains critically ill. Family meeting set up for tomorrow afternoon.     History reviewed. No pertinent past medical history.  History reviewed. No pertinent surgical history.  Social History:   Social History     Socioeconomic History    Marital status: Single     Spouse name: Not on file    Number of children: Not on file    Years of education: Not on file    Highest education level: Not on file   Social Needs    Financial resource strain: Not on file    Food insecurity - worry: Not on file    Food insecurity - inability: Not on file    Transportation needs - medical: Not on file    Transportation needs - non-medical: Not on file   Occupational History    Not on file   Tobacco Use    Smoking status: Never Smoker    Smokeless tobacco: Never Used   Substance and Sexual Activity    Alcohol use: Yes     Alcohol/week: 12.6 oz     Types: 21 Cans of beer per  week    Drug use: No    Sexual activity: Not on file   Other Topics Concern    Not on file   Social History Narrative    Not on file     History reviewed. No pertinent family history.  Drug Allergies:   Review of patient's allergies indicates:   Allergen Reactions    Pcn [penicillins] Hives     Current Infusions:   amiodarone in dextrose 5% 0.5 mg/min (12/11/18 1035)    dexmedetomidine (PRECEDEX) infusion 1.4 mcg/kg/hr (12/11/18 1604)    fentaNYL citrate (PF)-0.9%NaCl 75 mcg/hr (12/11/18 1515)    furosemide (LASIX) 2 mg/mL infusion (titrating) Stopped (12/09/18 2234)    norepinephrine bitartrate-D5W 3 mcg/kg/min (12/11/18 1828)     Scheduled Medications:     ceFEPime (MAXIPIME) IVPB  2 g Intravenous Q12H    enoxaparin  40 mg Subcutaneous Daily    folic acid  1 mg Per OG tube Daily    multivitamin  1 tablet Per OG tube Daily    pantoprazole  40 mg Intravenous Daily    sodium phosphate IVPB  9.99 mmol Intravenous Once    thiamine  100 mg Per OG tube Daily     PRN Medications:   dextrose 50%, fentaNYL **FOLLOWED BY** [START ON 12/14/2018] fentaNYL, glucagon (human recombinant), insulin aspart U-100, lorazepam, magnesium sulfate IVPB, sodium phosphate IVPB, sodium phosphate IVPB, sodium phosphate IVPB    Review of Systems:   A comprehensive 12-point review of systems was performed, and is negative except for those items mentioned above in the HPI section of this note.     Vital Signs:    Vitals:    12/11/18 1815   BP:    Pulse: (!) 133   Resp: (!) 27   Temp:        Fluid Balance:     Intake/Output Summary (Last 24 hours) at 12/11/2018 1840  Last data filed at 12/11/2018 1800  Gross per 24 hour   Intake 3207.92 ml   Output 3804 ml   Net -596.08 ml       Physical Exam:   General: pt intubated, sedated, critically ill  HEENT: AT/NC, PERRL  Neck: Supple without JVD. Trachea midline. Thyroid feels normal.   Cardiac: irregular rate and rhythm with no MRG. Cold extremities.  Respiratory: Normal inspection.  Symmetric chest rise. No increased work of breathing noted. Diminished lung sounds in anterior lung fields. + tachypnea  Abdomen: Soft, NT/ND. +BS. No palpable masses. No hepatosplenomegaly.   Lymphatic: No palpable supraclavicular or cervical LAD.   Extremities: Normal strength and tone (grossly). No visible atrophy. No clubbing or cyanosis on nail exam. + b/l pitting edema  Skin: Warm and dry without visible rash.   Neuro: pt intubated/sedated    Personal Review and Summary of Prior Diagnostics    Laboratory Studies:   Recent Labs   Lab 12/11/18  1534   PH 7.305*   PCO2 38.6   PO2 135*   HCO3 19.2*   POCSATURATED 99   BE -7     Recent Labs   Lab 12/11/18  0416   WBC 9.95   RBC 2.99*   HGB 10.4*   HCT 30.8*   PLT 82*   *   MCH 34.8*   MCHC 33.8     Recent Labs   Lab 12/11/18  1412   *   K 4.0      CO2 23   BUN 4*   CREATININE 0.6   MG 2.0       Microbiology Data:   Microbiology Results (last 7 days)     Procedure Component Value Units Date/Time    Blood culture [[id number]] Collected:  12/10/18 1424    Order Status:  Completed Specimen:  Blood from Peripheral, Hand, Right Updated:  12/11/18 1812     Blood Culture, Routine No Growth to date     Blood Culture, Routine No Growth to date    Blood culture [[id number]] Collected:  12/10/18 1415    Order Status:  Completed Specimen:  Blood from Peripheral, Left  Hand Updated:  12/11/18 1812     Blood Culture, Routine No Growth to date     Blood Culture, Routine No Growth to date    Culture, Body Fluid (Aerobic) w/ GS [[id number]] Collected:  12/09/18 0948    Order Status:  Completed Specimen:  Pleural Fluid Updated:  12/11/18 1305     AEROBIC CULTURE - FLUID No growth     Gram Stain Result Cytospin indicates:      Few WBC's      No organisms seen    Blood culture (site 2) [[id number]]  (Susceptibility) Collected:  12/08/18 4841    Order Status:  Completed Specimen:  Blood Updated:  12/11/18 0847     Blood Culture, Routine Gram stain aer bottle: Gram  negative rods      Blood Culture, Routine Results called to and read back by: Alisa Mccracken RN  12/09/2018  18:00      Blood Culture, Routine Gram stain anastacio bottle: Gram negative rods      Blood Culture, Routine Positive results previously called 12/09/2018  18:00     Blood Culture, Routine ESCHERICHIA COLI    Narrative:       Site # 2, aerobic only  Collection has been rescheduled by UAD at 12/08/2018 23:19 Reason:   Patient has central line.  Collection has been rescheduled by UAD at 12/08/2018 23:19 Reason:   Patient has central line.    Urine culture [[id number]]  (Susceptibility) Collected:  12/08/18 2249    Order Status:  Completed Specimen:  Urine Updated:  12/11/18 0637     Urine Culture, Routine --     ESCHERICHIA COLI  >100,000 cfu/ml      AFB culture (includes stain) [[id number]] Collected:  12/09/18 0948    Order Status:  Completed Specimen:  Body Fluid from Pleural Fluid Updated:  12/10/18 2127     AFB Culture & Smear Culture in progress     AFB CULTURE STAIN No acid fast bacilli seen.    Fungus culture [[id number]] Collected:  12/09/18 0948    Order Status:  Sent Specimen:  Body Fluid from Pleural Fluid Updated:  12/09/18 1152    Culture, Body Fluid - Bactec [[id number]] Collected:  12/09/18 0948    Order Status:  Canceled Specimen:  Pleural Fluid Updated:  12/09/18 0949    Gram stain [[id number]] Collected:  12/09/18 0948    Order Status:  Canceled Specimen:  Body Fluid from Pleural Fluid Updated:  12/09/18 0949    Blood culture (site 1) [[id number]] Collected:  12/08/18 2133    Order Status:  Sent Specimen:  Blood from Peripheral, Antecubital, Left Updated:  12/08/18 2134        Summary of Chest Imaging Personally Reviewed:     2D Echo: 12/10/18  · Severely decreased left ventricular systolic function. The estimated ejection fraction is 25%  · Moderate tricuspid regurgitation.  · Normal right ventricular systolic function.  · Severe left atrial enlargement.  · Severe atrial enlargement.  · Diastolic pattern  consistent with atrial fibrillation observed.  · Mild mitral regurgitation.  · The estimated PA systolic pressure is 41.94 mm Hg  · Pulmonary hypertension present.      US abd:   Cholelithiasis without sonographic evidence of cholecystitis.  Ascites, right pleural effusion, and additional findings as above.    CTA Chest:   1. No evidence of PE.  2. Moderate bilateral pleural effusions, left greater than right, resulting in near complete collapse of the left lower lobe and partial collapse of the left upper and right lower lobes.  3. Small lobular liver with small to moderate volume ascites seen within the upper abdomen.  Correlate for clinical history of cirrhosis.  4. Large hiatal hernia.    Impression & Recommendations    Systems-Based Impression & Recommendations    Neuro: Pt intubated/sedated with precedex/fentanyl.  - goal of RASS -1    Cardiovascular/Hemodynamics:  # Cardiogenic shock: Pt likely has cardiogenic and septic shock.   - currently on NE. Very challenging, as we would like to start dobutamine for cardiogenic shock however concern over starting dobutamine over his Afib w/ rvr.   - May need dobutamine or vasopressin soon.  - goal of MAP >65  - cardiology on board    # a fib w/ rvr: Likely contributing to his hemodynamics (or at least not helping it)  - cards on board. Pt currently on amiodarone gtt  - cant give rate controller due to hypotension  - monitor electrolytes, keeping K+>4 and Mg>2  - ?role of cardioversion but probably not in pt's best interest as he will need anticoagulation and further complications may arise    # HFrEF: Pt has b/l pleural effusions. Thoracentesis was transudated. Echo done 12/10/18 showing EF 25%. Moderate TR, severe LA enlargement, mild MR and pulmonary hypertension  - has CRRT. Will take off fluid slowly due to hypotension    Respiratory:   # acute hypoxemic respiratory failure: pt has b/l pleural effusions with left side worse than right. Due to heart failure.   -  will do spontaneous awakening trial daily    ID:   # septic shock: Pt urine showing e. Coli. Blood cultures positive for pan-sensitive e. coli.  - continue cefepime.  - repeat blood culture from 12/10 pending to assess for clearance    Renal:   # acute renal failure?: pt has no known pmh and unsure what his baseline cr is. He however has poor urine output despite lasix gtt and was started on crrt  - renal US showed no hydronephrosis, no stones  - nephrology on board    Heme/Onc: no active issues    GI:  # hepatic cirrhosis: Noted on CT abd. Family states that he is a chronic drinker. Goes to the bar every day  - on ciwa    Fluids, Electrolytes, & Nutrition:   - would recommend starting tube feeds    Pt critically ill.  Code status: DNR. Family meeting set for tomorrow afternoon.    Prophylaxis:   VTE-- on lovenox  PUD--protonix  Early Mobilization--intubated/sedated       Brock Martinez MD PGY-IV  [employer] [facility] Pulmonary/Critical Care Fellow  Pager [phone]

## 2018-12-12 NOTE — PLAN OF CARE
Problem: Patient Care Overview  Goal: Plan of Care Review  Outcome: Ongoing (interventions implemented as appropriate)  Patient on  with documented settings.  Alarms are set and functioning with adequate volumes.  AMBU bag and mask at bedside.will cont to monitor

## 2018-12-12 NOTE — PLAN OF CARE
Called and updated Dr. Richmond on pt's condition and ABG results. Pt seems to have more fluid in lungs, thin and milky, and CRRT was stopped per Dr. Aquino as pt was unable to tolerate fluid being taken off anymore. Dr. Richmond states the only thing we could do is increase the PEEP but it would lower the already low BP and increasing the FiO2 would not help due to the fluid. No new orders placed. Will continue to monitor.

## 2018-12-12 NOTE — PLAN OF CARE
Problem: Adult Inpatient Plan of Care  Goal: Plan of Care Review  Outcome: Ongoing (interventions implemented as appropriate)  Pt remains intubated and sedated. CRRT was stopped during the night due to inability to tolerate. BP steadily decreasing, 3 amps bicarb given and bicarb drip started. Levo drip still maxed, BP 50's-60's/40's. HR slowly increasing, sustaining 140's currently. Daughter stated she was coming back tonight to discuss withdrawal of care but never came back. Plan to withdraw care today.

## 2018-12-12 NOTE — PLAN OF CARE
Notified Dr. Ramachandran that patients SBP is in 60s-70s and maxed out on levophed at 3mcg. Asked MD to call daughter and give her an update. Received orders to get ABG. Will implement orders and cont to monitor.

## 2018-12-12 NOTE — PROGRESS NOTES
[facility]  Palliative Medicine  History and Physical     Patient Name: Thee Lane  MRN: [id number]  Admission Date: 12/12/2018  Hospital Length of Stay: 0 days  Code Status: Prior   Attending Provider: Minerva Davidson*  Consulting Provider: Minerva Davidson MD  Primary Care Physician: Mak Wilkins MD  Principal Problem:Comfort measures only status    Patient information was obtained from relative(s) and ER records.      Assessment/Plan:     * Comfort measures only status    - Comfort measures  -  O2 per NC  - Morphine prn for dyspnea  - Ativan prn for agitation  - Consult hosp[facility] for inpatient service     Palliative care encounter    - Comfort measures  - Consult [facility] for inpatient service     CHF (congestive heart failure)    -  O2 per NC  - Morphine prn for dyspnea  - Ativan prn for agitation       Acute respiratory failure with hypoxia and hypercapnia    - Palliative extubation  -  O2 per NC  - Morphine prn for dyspnea  - Ativan prn for agitation  - Consult hosp[facility] for inpatient service     SIRS (systemic inflammatory response syndrome)    Palliative extubation         none    Subjective:     Chief Complaint:   Chief Complaint   Patient presents with    Respiratory Distress       HPI:   Patient is a 68 y.o. M, EToH abuse presented to the ED c/o of SOB x 1 day prior to arrival to the ED.     Patient was reportedly sating the 60s, brought up to the 80's on transport and improved to the 90's while in the ED. Patient reported generalized weakness, coughing, wheezing and back pain x 1 week prior to presentation, however denies any subjective fever, chills, n/v.    On arrival to the ED patient's vitals were /78, P 130, R 34 Temp 99 and Spow 99%. Patient was found to D-dimer +. CTA completed showed B/l pleural effusions with collapse of Left and right lower lobe. While in the ED , patient wernt into Afib with RVR received diltiazem x2.  Blood pressure dropped to 80's/50s and patient started on levophed drip. Started Amiodarone drip, received Lasix 80mg x 1. Mg 2g after Mg level low at 0.7,. Ativan x 2 for anxiety.  Patient intubated, started on CRRT but stopped on 12/111 due to hypotension, patient with progressive decline. Family did not want aggressive measure and patient was made DNR  Family decided today to transition to comfort measures. Emotional comfort provided      Patient too unstable for transportation at this time. Requiring inhospital hospice services until stable or symptoms controlled. Consult [facility] for inpatient service    Hospital Course:  No notes on file    Interval History: Patient unresponsive, family by bedside. Discussed code status and confirmed DNR. Family decides to transition to comfort measures. Emotional comfort provided        Medications:  Continuous Infusions:  Scheduled Meds:  PRN Meds:    Objective:     Vital Signs (Most Recent):    Vital Signs (24h Range):  Temp:  [97 °F (36.1 °C)-102.6 °F (39.2 °C)] 102.6 °F (39.2 °C)  Pulse:  [109-149] 132  Resp:  [17-60] 22  SpO2:  [80 %-100 %] 92 %  BP: (45-87)/(23-67) 65/29  Arterial Line BP: ()/(27-83) 63/47        There is no height or weight on file to calculate BMI.    Review of Symptoms unable to assess    Symptom Assessment (ESAS 0-10 scale)  ESAS 0 1 2 3 4 5 6 7 8 9 10   Pain              Dyspnea              Anxiety              Nausea              Depression               Anorexia              Fatigue              Insomnia              Restlessness               Agitation              CAM / Delirium __ --  ___+   Constipation     __ --  ___+   Diarrhea           __ --  ___+        Performance Status: 0    ECOG Performance Status Grade: 4 - Completely disabled    Physical Exam    Significant Labs: None  CBC:   Recent Labs   Lab 12/12/18  0541   WBC 7.18   HGB 10.9*   HCT 33.4*   *   PLT 61*     BMP:  Recent Labs   Lab 12/12/18  0541       *   K 4.1   CL 99   CO2 15*   BUN 5*   CREATININE 1.0   CALCIUM 7.3*   MG 2.0     LFT:  Lab Results   Component Value Date    AST 52 (H) 12/11/2018    ALKPHOS 121 12/11/2018    BILITOT 1.6 (H) 12/11/2018     Albumin:   Albumin   Date Value Ref Range Status   12/12/2018 1.8 (L) 3.5 - 5.2 g/dL Final     Protein:   Total Protein   Date Value Ref Range Status   12/11/2018 5.3 (L) 6.0 - 8.4 g/dL Final     Lactic acid:   Lab Results   Component Value Date    LACTATE 1.7 12/10/2018    LACTATE 2.9 (H) 12/09/2018       Significant Imaging: None    Advanced Directives::  Living Will: No  LaPOST: Yes  Do Not Resuscitate Status: Yes  Medical Power of : Yes. Agent's Name: [email or web address]. Agent's Contact Number:     Decision-Making Capacity: Family answered questions, Patient unable to communicate due to disease severity/cognitive impairment    Living Arrangements: Lives with friend    Psychosocial/Cultural:  Patient's most important priorities:  Unable to assess    Patient's biggest concerns/fears:  Unable to assess    Previous death/end of life care history:  Unable to assess    Patient's goals/hopes:  Unable to assess    Spiritual: Confucianism    F- Naila and Belief: Unable to assess    I - Importance: Unable to assess  .  C - Community: family    A - Address in Care: Unable to assess      > 50% of 70 min visit spent in chart review, face to face discussion of goals of care,  symptom assessment, coordination of care and emotional support.    Minerva Davidson MD  Palliative Medicine  [facility]

## 2018-12-12 NOTE — CHAPLAIN
Patient was withdrawn from life support and he passed away. Hospice nurse completed their paperwork and our RN did Decedent Care Worksheet because patient will go to the morgue from the unit.  Family will attempt to donated body to science.  [facility] will  body tomorrow.

## 2018-12-12 NOTE — DISCHARGE SUMMARY
Discharge Summary      Admit Date: 12/8/2018    Discharge Date and Time: 12/12/2018    Attending Physician: Severyn Yaroshevsky, MD     Discharge Physician: French Mercer MD     Principal Diagnoses: Septic shock  The primary encounter diagnosis was Shortness of breath. Diagnoses of Tachycardia, Atrial fibrillation with RVR, Afib, Other ascites, Pleural effusion, Goals of care, counseling/discussion, Palliative care encounter, Acute respiratory failure with hypoxia and hypercapnia, SIRS (systemic inflammatory response syndrome), Hepatomegaly, and Septic shock were also pertinent to this visit.     Patient is a 68 y.o. male presents to the ED c/o of SOB x 1 day prior to arrival to the ED. Patient was reportedly sating the 60s, brought up to the 80's on transport and improved to the 90's while in the ED. Patient reported generalized weakness, coughing, wheezing and back pain x 1 week prior to presentation, however denies any subjective fever, chills, n/v. Patient reports that this has never happened in the past. Patient was found to have D-dimer +. CTA completed showed B/l pleural effusions with collapse of Left and right lower lobe. While in the ED, patient wernt into Afib with RVR and received diltiazem x2. Blood pressure dropped to 80's/50s and patient started on levophed drip. Cards consulted and reccommended Amiodarone drip. Patient received Lasix 80mg x 1. Mg 2g after Mg level low at 0.7,. Ativan x 2 for anxiety.  Patient met SIRS criteria.       12/09:  Patient intubated and sedated with amiodarone drip.  Corrected hypocalcemia and hypomagnesemia.   Thoracentesis performed with follow up ECH and CXR.  Patient requiring Levophed for hypotension.    12/10:  Blood cultures positive for gram negative rods.  Patient's family able to be contacted today.  Catheter placed and CRRT initiated.  Dose of albumin was given to pull fluid off and due to low albumin.    12/11:  CRRT pulled of minimal fluid.  Poor  prognosis, patient is receiving maximum dose of levophed.  Patient still hypotensive (BP 50's/20's overnight).  Withdrawing care today.  Transferred to inpatient hospice today        Discharged Condition: poor    Hospital Course:     Urosepsis with ecoli   Cardiogenic shock  A fib with rvr  Acute hypoxic respiratory failure intubated  Cirrhosis  Alcohol withdrawal ativan prn       Neuro:   - Intubated on precedex/fentanyl      CV     Cardiogenic shock  On levophed   Cardiology following  Goal MAP>65  Lasix ggt but not adequate output  CRRT stopped last night  Family wants to withdraw care today and no second pressor  Echo 25%      A fib with rvr   Amiodarone drip   Cardiology following  Tachycardic      Pulm     Acute hypoxemic respiratory failure  Intubated   Pulmonology following   Will withdrawal care today      FENa/GI     Elevated glucose  SSI  A1c 5.3     Suspected Cirrhosis  Heavy drinker     IVF/Nutrition/Lytes  -Cont LR, hold on nutrition for now, keep Mg 2, K 4     ID     Septic shock from UTI  -BC growing gram negative rods ecoli sensitivity pending   -Follow up sensitivities  -Dirty UA  -On cefepime      Heme     Anemia   Stable  -Trend   -         Recent Labs   Lab 12/08/18  1700   12/11/18  0416 12/12/18  0541   HGB 11.7*   < > 10.4* 10.9*   HCT 36.0*   < > 30.8* 33.4*      < > 82* 61*   INR 1.6*  --   --  1.9*    < > = values in this interval not displayed.         Nephro  - CRRT stopped  -nephrology following   - IVF started and will continue to monitor.   -Monitor I/O's, trend Cr, avoid renal toxic meds      Consults: IP CONSULT TO PULMONOLOGY  IP CONSULT TO REGISTERED DIETITIAN/NUTRITIONIST  IP CONSULT TO ANESTHESIOLOGY  IP CONSULT TO ANESTHESIOLOGY  IP CONSULT TO PALLIATIVE CARE  IP CONSULT TO NEPHROLOGY-KIDNEY CONSULTANTS  IP CONSULT TO GENERAL SURGERY  IP CONSULT TO SOCIAL WORK/CASE MANAGEMENT  IP CONSULT TO GASTROENTEROLOGY-[facility]  IP CONSULT TO PALLIATIVE CARE  IP CONSULT TO SPIRITUAL  CARE      Disposition: Hospice/Medical Facility    Patient Instructions:   Current Discharge Medication List      CONTINUE these medications which have NOT CHANGED    Details   oxycodone HCl/acetaminophen (PERCOCET ORAL) Take by mouth.         STOP taking these medications       traMADol (ULTRAM) 50 mg tablet Comments:   Reason for Stoppin minutes was spent on this discharge summary.   French Mercer  2018  11:48 AM

## 2018-12-12 NOTE — PLAN OF CARE
Problem: Adult Inpatient Plan of Care  Goal: Plan of Care Review  Outcome: Ongoing (interventions implemented as appropriate)  Patient on  with documented settings.  Alarms are set and functioning with adequate volumes.  AMBU bag and mask at bedside.

## 2018-12-12 NOTE — H&P
[facility]  Palliative Medicine  History and Physical     Patient Name: Thee Lane  MRN: [id number]  Admission Date: 12/12/2018  Hospital Length of Stay: 0 days  Code Status: Prior   Attending Provider: Minerva Davidson*  Consulting Provider: Minerva Davidson MD  Primary Care Physician: Mak Wilkins MD  Principal Problem:Comfort measures only status    Patient information was obtained from relative(s) and ER records.      Assessment/Plan:     * Comfort measures only status    - Comfort measures  -  O2 per NC  - Morphine prn for dyspnea  - Ativan prn for agitation  - Consult hospice [facility] for inpatient service     Palliative care encounter    - Comfort measures  - Consult hospice [facility] for inpatient service     CHF (congestive heart failure)    -  O2 per NC  - Morphine prn for dyspnea  - Ativan prn for agitation       Acute respiratory failure with hypoxia and hypercapnia    - Palliative extubation  -  O2 per NC  - Morphine prn for dyspnea  - Ativan prn for agitation  - Consult hospice [facility] for inpatient service     SIRS (systemic inflammatory response syndrome)    Palliative extubation         none    Subjective:     Chief Complaint:   Chief Complaint   Patient presents with    Respiratory Distress       HPI:   Patient is a 68 y.o. M, EToH abuse presented to the ED c/o of SOB x 1 day prior to arrival to the ED.     Patient was reportedly sating the 60s, brought up to the 80's on transport and improved to the 90's while in the ED. Patient reported generalized weakness, coughing, wheezing and back pain x 1 week prior to presentation, however denies any subjective fever, chills, n/v.    On arrival to the ED patient's vitals were /78, P 130, R 34 Temp 99 and Spow 99%. Patient was found to D-dimer +. CTA completed showed B/l pleural effusions with collapse of Left and right lower lobe. While in the ED , patient wernt into Afib with RVR received diltiazem x2.  Blood pressure dropped to 80's/50s and patient started on levophed drip. Started Amiodarone drip, received Lasix 80mg x 1. Mg 2g after Mg level low at 0.7,. Ativan x 2 for anxiety.  Patient intubated, started on CRRT but stopped on 12/111 due to hypotension, patient with progressive decline. Family did not want aggressive measure and patient was made DNR  Family decided today to transition to comfort measures. Emotional comfort provided      Patient too unstable for transportation at this time. Requiring inhospital hospice services until stable or symptoms controlled. Consult hospice [facility] for inpatient service    Hospital Course:  No notes on file    Interval History: Patient unresponsive, family by bedside. Discussed code status and confirmed DNR. Family decides to transition to comfort measures. Emotional comfort provided        Medications:  Continuous Infusions:  Scheduled Meds:  PRN Meds:    Objective:     Vital Signs (Most Recent):    Vital Signs (24h Range):  Temp:  [97 °F (36.1 °C)-102.6 °F (39.2 °C)] 102.6 °F (39.2 °C)  Pulse:  [109-149] 132  Resp:  [17-60] 22  SpO2:  [80 %-100 %] 92 %  BP: (45-87)/(23-67) 65/29  Arterial Line BP: ()/(27-83) 63/47        There is no height or weight on file to calculate BMI.    Review of Symptoms unable to assess    Symptom Assessment (ESAS 0-10 scale)  ESAS 0 1 2 3 4 5 6 7 8 9 10   Pain              Dyspnea              Anxiety              Nausea              Depression               Anorexia              Fatigue              Insomnia              Restlessness               Agitation              CAM / Delirium __ --  ___+   Constipation     __ --  ___+   Diarrhea           __ --  ___+        Performance Status: 0    ECOG Performance Status Grade: 4 - Completely disabled    Physical Exam    Significant Labs: None  CBC:   Recent Labs   Lab 12/12/18  0541   WBC 7.18   HGB 10.9*   HCT 33.4*   *   PLT 61*     BMP:  Recent Labs   Lab 12/12/18  0541       *   K 4.1   CL 99   CO2 15*   BUN 5*   CREATININE 1.0   CALCIUM 7.3*   MG 2.0     LFT:  Lab Results   Component Value Date    AST 52 (H) 12/11/2018    ALKPHOS 121 12/11/2018    BILITOT 1.6 (H) 12/11/2018     Albumin:   Albumin   Date Value Ref Range Status   12/12/2018 1.8 (L) 3.5 - 5.2 g/dL Final     Protein:   Total Protein   Date Value Ref Range Status   12/11/2018 5.3 (L) 6.0 - 8.4 g/dL Final     Lactic acid:   Lab Results   Component Value Date    LACTATE 1.7 12/10/2018    LACTATE 2.9 (H) 12/09/2018       Significant Imaging: None    Advanced Directives::  Living Will: No  LaPOST: Yes  Do Not Resuscitate Status: Yes  Medical Power of : Yes. Agent's Name: [email or web address]. Agent's Contact Number:     Decision-Making Capacity: Family answered questions, Patient unable to communicate due to disease severity/cognitive impairment    Living Arrangements: Lives with friend    Psychosocial/Cultural:  Patient's most important priorities:  Unable to assess    Patient's biggest concerns/fears:  Unable to assess    Previous death/end of life care history:  Unable to assess    Patient's goals/hopes:  Unable to assess    Spiritual: Rastafari    F- Naila and Belief: Unable to assess    I - Importance: Unable to assess  .  C - Community: family    A - Address in Care: Unable to assess      > 50% of 70 min visit spent in chart review, face to face discussion of goals of care,  symptom assessment, coordination of care and emotional support.    Minerva Davidson MD  Palliative Medicine  [facility]

## 2018-12-12 NOTE — ASSESSMENT & PLAN NOTE
- Palliative extubation  -  O2 per NC  - Morphine prn for dyspnea  - Ativan prn for agitation  - Consult hospice [facility] for inpatient service

## 2018-12-12 NOTE — PLAN OF CARE
Family at bedside. Fentanyl gtt infusing. Ativan prn given. Pt terminally extubated. Patient appears to bed comfortable at this time.

## 2018-12-12 NOTE — PLAN OF CARE
Patient transitioned to Inpatient Hospice on Dr. Plummer service with palliative care       12/12/18 8055   Final Note   Assessment Type Final Discharge Note   Anticipated Discharge Disposition [facility]

## 2018-12-12 NOTE — PROGRESS NOTES
Noted pt now comfort measures only and terminally extubated. Please re-consult if needed.    Nerissa Glass, RD, LDN

## 2018-12-12 NOTE — ASSESSMENT & PLAN NOTE
- Comfort measures  -  O2 per NC  - Morphine prn for dyspnea  - Ativan prn for agitation  - Consult hospice [facility] for inpatient service

## 2018-12-12 NOTE — PLAN OF CARE
Notified Dr. Ramachandran that patients temp 102.4. Received orders to give Tylenol 1g IV x 1 dose now. Will implement orders and cont to monitor.

## 2018-12-12 NOTE — HPI
Patient is a 68 y.o. M, EToH abuse presented to the ED c/o of SOB x 1 day prior to arrival to the ED.     Patient was reportedly sating the 60s, brought up to the 80's on transport and improved to the 90's while in the ED. Patient reported generalized weakness, coughing, wheezing and back pain x 1 week prior to presentation, however denies any subjective fever, chills, n/v.    On arrival to the ED patient's vitals were /78, P 130, R 34 Temp 99 and Spow 99%. Patient was found to D-dimer +. CTA completed showed B/l pleural effusions with collapse of Left and right lower lobe. While in the ED , patient wernt into Afib with RVR received diltiazem x2. Blood pressure dropped to 80's/50s and patient started on levophed drip. Started Amiodarone drip, received Lasix 80mg x 1. Mg 2g after Mg level low at 0.7,. Ativan x 2 for anxiety.  Patient intubated, started on CRRT but stopped on 12/111 due to hypotension, patient with progressive decline. Family did not want aggressive measure and patient was made DNR  Family decided today to transition to comfort measures. Emotional comfort provided      Patient too unstable for transportation at this time. Requiring inhospital hospice services until stable or symptoms controlled. Consult hospice [facility] for inpatient service.    Fentanyl gtt infusing. Patient terminally extubated. Ativan prn given.  Called that patient is asystole on the monitor. Patient was pronounced dead 1345 on 12/12/2018.

## 2018-12-12 NOTE — PROGRESS NOTES
Progress Note    Admit Date: 12/8/2018   LOS: 4 days     SUBJECTIVE:       Patient is still intubated on pressors and amiodarone and CRRT was stopped last night due to low blood pressures.  He got albumin overnight. Bicarb drip started last night. Tachycardic overnight. Wife did not want second pressor or aggressive measures. Daughter did not come yesterday. Plan to withdraw care today.     Review of Systems   Unable to obtain due to condition       Continuous Infusions:   amiodarone in dextrose 5% 0.5 mg/min (12/12/18 0318)    dexmedetomidine (PRECEDEX) infusion 1.4 mcg/kg/hr (12/12/18 0649)    custom IV infusion builder 30 mL/hr at 12/11/18 2156    fentaNYL citrate (PF)-0.9%NaCl 125 mcg/hr (12/12/18 0156)    furosemide (LASIX) 2 mg/mL infusion (titrating) Stopped (12/09/18 2234)    norepinephrine bitartrate-D5W 3 mcg/kg/min (12/12/18 0438)    vasopressin (PITRESSIN) infusion       Scheduled Meds:   ceFEPime (MAXIPIME) IVPB  2 g Intravenous Q12H    enoxaparin  40 mg Subcutaneous Daily    folic acid  1 mg Per OG tube Daily    multivitamin  1 tablet Per OG tube Daily    pantoprazole  40 mg Intravenous Daily    thiamine  100 mg Per OG tube Daily     PRN Meds:dextrose 50%, fentaNYL **FOLLOWED BY** [START ON 12/14/2018] fentaNYL, glucagon (human recombinant), heparin (porcine), insulin aspart U-100, lorazepam, magnesium sulfate IVPB, sodium phosphate IVPB, sodium phosphate IVPB, sodium phosphate IVPB    Review of patient's allergies indicates:   Allergen Reactions    Pcn [penicillins] Hives       OBJECTIVE:     Vital Signs (Most Recent)  Temp: 99.7 °F (37.6 °C) (12/12/18 0400)  Pulse: (!) 143 (12/12/18 0645)  Resp: (!) 21 (12/12/18 0645)  BP: (!) 71/52 (12/12/18 0600)  SpO2: (!) 93 % (12/12/18 0645)    Vital Signs Range (Last 24H):  Temp:  [96.2 °F (35.7 °C)-99.7 °F (37.6 °C)]   Pulse:  [109-149]   Resp:  [17-60]   BP: (45-87)/(23-67)   SpO2:  [80 %-100 %]   Arterial Line BP: ()/(37-87)     I & O  (Last 24H):    Intake/Output Summary (Last 24 hours) at 12/12/2018 0658  Last data filed at 12/12/2018 0600  Gross per 24 hour   Intake 4117.37 ml   Output 2426 ml   Net 1691.37 ml     Ventilator Data (Last 24H):     Vent Mode: A/C  Oxygen Concentration (%):  [] 70  Resp Rate Total:  [20 br/min-29 br/min] 24 br/min  Vt Set:  [400 mL] 400 mL  PEEP/CPAP:  [7 cmH20-8 cmH20] 8 cmH20  Mean Airway Pressure:  [10 cmH20-15 cmH20] 12 cmH20    Physical Exam:    GEN - intubated pale  Oral - MMM; no exudate  CHEST - CTA B ;equal expansion  HEART - rrr; no m/r/s3/s4  ABD - soft; nonttp  EXTR - cold; no edema  NEURO - no asterixis or focal deficits   SKIN - pale weak pulses    Lines/Drains:       Percutaneous Central Line Insertion/Assessment - triple lumen  12/08/18 8228 right internal jugular (Active)   Dressing biopatch in place;dressing dry and intact 12/10/2018  3:15 AM   Securement secured w/ sutures 12/10/2018  3:15 AM   Additional Site Signs no erythema;no warmth;no edema;no pain;no palpable cord;no streak formation;no drainage 12/10/2018  3:15 AM   Distal Patency/Care infusing 12/10/2018  3:15 AM   Medial Patency/Care infusing 12/10/2018  3:15 AM   Proximal Patency/Care infusing 12/10/2018  3:15 AM   Waveform normal 12/10/2018  3:15 AM   Line Interventions line leveled/zeroed 12/10/2018  3:15 AM   Dressing Change Due 12/15/18 12/10/2018  3:15 AM   Daily Line Review Performed 12/10/2018  3:15 AM   Number of days: 1            Trialysis (Dialysis) Catheter 12/09/18 1957 right femoral (Active)   IV Device Securement sutures 12/10/2018  3:15 AM   Additional Site Signs no erythema;no warmth;no edema;no pain;no palpable cord;no streak formation;no drainage 12/10/2018  3:15 AM   Patency/Care flushed w/o difficulty;normal saline locked 12/10/2018  3:15 AM   Site Assessment Clean;Dry;Intact;No redness;No swelling 12/10/2018  3:15 AM   Status Accessed 12/10/2018  3:15 AM   Flows Good 12/10/2018  3:15 AM   Dressing Status  Biopatch in place;Clean;Dry;Intact 12/10/2018  3:15 AM   Dressing Change Due 12/16/18 12/10/2018  3:15 AM   Verification by X-ray Yes 12/9/2018  8:00 PM   Site Condition No complications 12/10/2018  3:15 AM   Dressing Occlusive 12/10/2018  3:15 AM   Daily Line Review Performed 12/10/2018  3:15 AM   Number of days: 0            Peripheral IV - Single Lumen 12/08/18 1729 Right Antecubital (Active)   Site Assessment Clean;Dry;Intact;No redness;No swelling 12/10/2018  3:15 AM   Line Status Flushed;Saline locked 12/10/2018  3:15 AM   Dressing Status Clean;Dry;Intact 12/10/2018  3:15 AM   Dressing Change Due 12/12/18 12/10/2018  3:15 AM   Site Change Due 12/12/18 12/9/2018  7:15 PM   Reason Not Rotated Not due 12/10/2018  3:15 AM   Number of days: 1            Peripheral IV - Single Lumen 12/08/18 1751 Right Hand (Active)   Site Assessment Clean;Dry;Intact;No redness;No swelling 12/10/2018  3:15 AM   Line Status Infusing 12/10/2018  3:15 AM   Dressing Status Clean;Dry;Intact 12/10/2018  3:15 AM   Dressing Change Due 12/12/18 12/10/2018  3:15 AM   Site Change Due 12/12/18 12/9/2018  7:15 PM   Reason Not Rotated Not due 12/10/2018  3:15 AM   Number of days: 1            Peripheral IV - Single Lumen 12/08/18 2105 Right Forearm (Active)   Site Assessment Clean;Dry;Intact;No redness;No swelling 12/10/2018  3:15 AM   Line Status Infusing 12/10/2018  3:15 AM   Dressing Status Clean;Dry;Intact 12/10/2018  3:15 AM   Dressing Change Due 12/12/18 12/10/2018  3:15 AM   Site Change Due 12/12/18 12/9/2018  7:15 PM   Reason Not Rotated Not due 12/10/2018  3:15 AM   Number of days: 1            Arterial Line 12/09/18 0751 Left Radial (Active)   Site Assessment Clean;Dry;Intact;No redness;No swelling 12/10/2018  3:15 AM   Line Status Pulsatile blood flow 12/10/2018  3:15 AM   Art Line Waveform Appropriate;Square wave test performed 12/10/2018  3:15 AM   Arterial Line Interventions Zeroed and calibrated;Leveled;Connections checked and  "tightened;Flushed per protocol 12/10/2018  3:15 AM   Color/Movement/Sensation Capillary refill less than 3 sec 12/10/2018  3:15 AM   Dressing Type Transparent 12/10/2018  3:15 AM   Dressing Status Clean;Dry;Intact 12/10/2018  3:15 AM   Dressing Intervention New dressing 12/9/2018  7:51 AM   Dressing Change Due 12/16/18 12/10/2018  3:15 AM   Number of days: 0            NG/OG Tube 12/09/18 0200 [facility] mouth (Active)   Placement Check placement verified by aspirate characteristics;placement verified by aspirate pH 12/10/2018  3:15 AM   pH Aspirate Result 5 12/9/2018  7:15 PM   Distal Tube Length (cm) 65 12/9/2018  3:05 AM   Tolerance no signs/symptoms of discomfort 12/10/2018  3:15 AM   Securement taped to commercial device 12/10/2018  3:15 AM   Clamp Status/Tolerance unclamped 12/10/2018  3:15 AM   Suction Setting/Drainage Method suction at;low;intermittent setting 12/10/2018  3:15 AM   Insertion Site Appearance no redness, warmth, tenderness, skin breakdown, drainage 12/10/2018  3:15 AM   Drainage Clear 12/10/2018  3:15 AM   Flush/Irrigation flushed w/;water;no resistance met 12/10/2018  3:15 AM   Tube Output(mL)(Include Discarded Residual) 100 mL 12/10/2018  6:00 AM   Number of days: 1            Urethral Catheter 12/08/18 2231 (Active)   Site Assessment Clean;Intact 12/10/2018  3:15 AM   Collection Container Urimeter 12/10/2018  3:15 AM   Securement Method secured to top of thigh w/ adhesive device 12/10/2018  3:15 AM   Catheter Care Performed yes 12/10/2018  3:15 AM   Reason for Continuing Urinary Catheterization Critically ill in ICU requiring intensive monitoring 12/10/2018  3:15 AM   CAUTI Prevention Bundle StatLock in place w 1" slack;Intact seal between catheter & drainage tubing;Drainage bag off the floor;Green sheeting clip in use;No dependent loops or kinks;Drainage bag not overfilled (<2/3 full);Drainage bag below bladder 12/9/2018  7:15 PM   Output (mL) 10 mL 12/10/2018  6:00 AM   Number of " days: 1       Recent Labs     12/09/18  1013  12/10/18  0657 12/10/18  1024  12/11/18  0416 12/11/18  1412 12/11/18  2307 12/12/18  0541   WBC  --   --  10.95  --   --  9.95  --   --  7.18   HGB  --   --  11.1*  --   --  10.4*  --   --  10.9*   HCT  --   --  31.5*  --   --  30.8*  --   --  33.4*   PLT  --   --  103*  --   --  82*  --   --  61*   *   < >  --   --    < > 133* 135* 134* 132*   K 3.3*   < >  --   --    < > 4.0 4.0 3.7 4.1      < >  --   --    < > 103 101 102 99   CO2 15*   < >  --   --    < > 20* 23 16* 15*   BUN 14   < >  --   --    < > 6* 4* 4* 5*   CREATININE 1.2   < >  --   --    < > 0.7 0.6 0.7 1.0   BILITOT 1.1*  --   --  1.6*  --  1.6*  --   --   --    AST 59*  --   --  59*  --  52*  --   --   --    ALT 19  --   --  16  --  14  --   --   --    ALKPHOS 153*  --   --  128  --  121  --   --   --    CALCIUM 6.7*   < >  --   --    < > 7.4* 7.4* 7.1* 7.3*   ALBUMIN 1.4*   < >  --  2.4*   < > 2.5*  2.5* 2.2* 1.7* 1.8*   PROT 5.1*  --   --  5.4*  --  5.3*  --   --   --    MG 1.6   < >  --   --    < > 2.0 2.0 1.8 2.0   PHOS  --    < >  --   --    < > 3.4 2.1* 3.2 4.0    < > = values in this interval not displayed.         ASSESSMENT/PLAN:     Urosepsis with ecoli   Cardiogenic shock  A fib with rvr  Acute hypoxic respiratory failure intubated  Cirrhosis  Alcohol withdrawal ativan prn      Neuro:   - Intubated on precedex/fentanyl      CV    Cardiogenic shock  On levophed   Cardiology following  Goal MAP>65  Lasix ggt but not adequate output  CRRT stopped last night  Family wants to withdraw care today and no second pressor  Echo 25%     A fib with rvr   Amiodarone drip   Cardiology following  Tachycardic     Pulm    Acute hypoxemic respiratory failure  Intubated   Pulmonology following   Will withdrawal care today      FENa/GI    Elevated glucose  SSI  A1c 5.3     Suspected Cirrhosis  Heavy drinker    IVF/Nutrition/Lytes  -Cont LR, hold on nutrition for now, keep Mg 2, K 4    ID    Septic  shock from UTI  -BC growing gram negative rods ecoli sensitivity pending   -Follow up sensitivities  -Dirty UA  -On cefepime     Heme    Anemia   Stable  -Trend   -  Recent Labs   Lab 12/08/18  1700  12/11/18  0416 12/12/18  0541   HGB 11.7*   < > 10.4* 10.9*   HCT 36.0*   < > 30.8* 33.4*      < > 82* 61*   INR 1.6*  --   --  1.9*    < > = values in this interval not displayed.        Nephro  - CRRT stopped  -nephrology following   - IVF started and will continue to monitor.   -Monitor I/O's, trend Cr, avoid renal toxic meds     Core:  DVT ppx w/ lovenox   SRMB ppx w/ pepcid  HOB elevated 30 degress  Diet: NPO   DNR    Plan to withdraw care today. Family should come today and they do not want second pressure.     French Mercer MD  12/12/2018

## 2018-12-12 NOTE — LETTER
December 12, 2018         [address] [place] [address] Ave  [place] [address]  Phone: [phone]  Fax: [phone]       Patient: Thee Lane     To Whom It May Concern:    Sue Lane, the grandfather of Dimitrios Tao, was at [facility]  in the Intensive Care Unit. Please excuse him from school during this difficult time. If you have any questions or concerns, or if I can be of further assistance, please do not hesitate to contact me.    Sincerely,            Rachele MARINN, RN    Intensive Care Unit  [facility]

## 2018-12-12 NOTE — PLAN OF CARE
Dr. Rowe spoke with pt's daughter about pt's worsening condition. MD asked if daughter wanted to elevate treatment to try to keep pt alive through the night though pt is maxed on levo with a pressure of 70's/40's. Daughter states she does not want to elevate treatment further and she will come back to the hospital tonight to discuss withdrawal of care.

## 2018-12-12 NOTE — PROGRESS NOTES
Reported to Dr. Richmond. No new orders.    Results for RUI PATRICIO (MRN [id number]) as of 12/12/2018 00:21   Ref. Range 12/11/2018 23:32   POC PH Latest Ref Range: 7.35 - 7.45  7.274 (LL)   POC PCO2 Latest Ref Range: 35 - 45 mmHg 35.8   POC PO2 Latest Ref Range: 80 - 100 mmHg 52 (LL)   POC BE Latest Ref Range: -2 to 2 mmol/L -10   POC HCO3 Latest Ref Range: 24 - 28 mmol/L 16.6 (L)   POC SATURATED O2 Latest Ref Range: 95 - 100 % 82 (L)   POC TCO2 Latest Ref Range: 23 - 27 mmol/L 18 (L)   FiO2 Unknown 70   Vt Unknown 400   PEEP Unknown 8   Sample Unknown ARTERIAL   DelSys Unknown Adult Vent   Allens Test Unknown N/A   Site Unknown Sivakumar/UAC   Mode Unknown AC/PRVC   Rate Unknown 20

## 2018-12-12 NOTE — SUBJECTIVE & OBJECTIVE
Interval History: Patient unresponsive, family by bedside. Discussed code status and confirmed DNR. Family decides to transition to comfort measures. Emotional comfort provided        Medications:  Continuous Infusions:  Scheduled Meds:  PRN Meds:    Objective:     Vital Signs (Most Recent):    Vital Signs (24h Range):  Temp:  [97 °F (36.1 °C)-102.6 °F (39.2 °C)] 102.6 °F (39.2 °C)  Pulse:  [109-149] 132  Resp:  [17-60] 22  SpO2:  [80 %-100 %] 92 %  BP: (45-87)/(23-67) 65/29  Arterial Line BP: ()/(27-83) 63/47        There is no height or weight on file to calculate BMI.    Review of Symptoms unable to assess    Symptom Assessment (ESAS 0-10 scale)  ESAS 0 1 2 3 4 5 6 7 8 9 10   Pain              Dyspnea              Anxiety              Nausea              Depression               Anorexia              Fatigue              Insomnia              Restlessness               Agitation              CAM / Delirium __ --  ___+   Constipation     __ --  ___+   Diarrhea           __ --  ___+        Performance Status: 0    ECOG Performance Status Grade: 4 - Completely disabled    Physical Exam    Significant Labs: None  CBC:   Recent Labs   Lab 12/12/18  0541   WBC 7.18   HGB 10.9*   HCT 33.4*   *   PLT 61*     BMP:  Recent Labs   Lab 12/12/18  0541      *   K 4.1   CL 99   CO2 15*   BUN 5*   CREATININE 1.0   CALCIUM 7.3*   MG 2.0     LFT:  Lab Results   Component Value Date    AST 52 (H) 12/11/2018    ALKPHOS 121 12/11/2018    BILITOT 1.6 (H) 12/11/2018     Albumin:   Albumin   Date Value Ref Range Status   12/12/2018 1.8 (L) 3.5 - 5.2 g/dL Final     Protein:   Total Protein   Date Value Ref Range Status   12/11/2018 5.3 (L) 6.0 - 8.4 g/dL Final     Lactic acid:   Lab Results   Component Value Date    LACTATE 1.7 12/10/2018    LACTATE 2.9 (H) 12/09/2018       Significant Imaging: None    Advanced Directives::  Living Will: No  LaPOST: Yes  Do Not Resuscitate Status: Yes  Medical Power of :  Yes. Agent's Name: [email or web address]. Agent's Contact Number:     Decision-Making Capacity: Family answered questions, Patient unable to communicate due to disease severity/cognitive impairment    Living Arrangements: Lives with friend    Psychosocial/Cultural:  Patient's most important priorities:  Unable to assess    Patient's biggest concerns/fears:  Unable to assess    Previous death/end of life care history:  Unable to assess    Patient's goals/hopes:  Unable to assess    Spiritual: Nondenominational    F- Naila and Belief: Unable to assess    I - Importance: Unable to assess  .  C - Community: family    A - Address in Care: Unable to assess

## 2018-12-13 LAB
BACTERIA FLD AEROBE CULT: NO GROWTH
GRAM STN SPEC: NORMAL

## 2018-12-13 NOTE — PHYSICIAN QUERY
"PT Name: Thee Lane  MR #: [id number]    Physician Query Form - Hematology Clarification      CDS/: Parris Magaña               Contact information: [email or web address]     This form is a permanent document in the medical record.      Query Date: December 13, 2018    By submitting this query, we are merely seeking further clarification of documentation. Please utilize your independent clinical judgment when addressing the question(s) below.    The Medical record contains the following:   Indicators  Supporting Clinical Findings Location in Medical Record   X "Anemia" documented Anemia   Nephrology PN 12/10   X H & H = Hemoglobin 11.7- 10- 10.9  Hematocrit 36- 29.9- 33.4     BP =                     HR=      "GI bleeding" documented      Acute bleeding (Non GI site)      Transfusion(s)      Treatment:     X Other:  Suspected Cirrhosis  Heavy drinker DC Summary      Provider, please specify diagnosis or diagnoses associated with above clinical findings.    [  ] Acute blood loss anemia   [  ] Aplastic anemia   [  ] Iron deficiency anemia   [ x ] Chronic blood loss anemia   [  ] Pernicious anemia   [  ] Precipitous drop in Hematocrit     [  ] Anemia of chronic disease ( Specify chronic disease)       [  ] CKD (specify stage):   [ x ] Liver disease    [  ] Other (Specify):   [  ] Clinically Undetermined       [  ] Other Hematological Diagnosis (please specify):     [  ] Clinically Undetermined       Please document in your progress notes daily for the duration of treatment, until resolved, and include in your discharge summary.                                                                                                      "

## 2018-12-13 NOTE — SUBJECTIVE & OBJECTIVE
Interval History: Fentanyl gtt infusing. Patient terminally extubated. Ativan prn given. Family by bedside.  Called that patient is asystole on the monitor. Patient was pronounced dead 1345 on 12/12/2018.    Medications:  Continuous Infusions:   fentanyl 100 mcg/hr (12/12/18 1230)     Scheduled Meds:   lorazepam  2 mg Intravenous Q2H     PRN Meds:    Objective:     Vital Signs (Most Recent):  Pulse: (!) 0 (12/12/18 1345)  Resp: (!) 0 (12/12/18 1345)  BP: (!) 62/46 (12/12/18 1300)  SpO2: 98 % (12/12/18 1330) Vital Signs (24h Range):  Temp:  [97 °F (36.1 °C)-102.6 °F (39.2 °C)] 102.6 °F (39.2 °C)  Pulse:  [0-149] 0  Resp:  [0-36] 0  SpO2:  [70 %-98 %] 98 %  BP: (45-81)/(23-57) 62/46  Arterial Line BP: (8-86)/(8-59) 8/8        There is no height or weight on file to calculate BMI.    Review of Symptoms  Symptom Assessment (ESAS 0-10 scale)  ESAS 0 1 2 3 4 5 6 7 8 9 10   Pain              Dyspnea              Anxiety              Nausea              Depression               Anorexia              Fatigue              Insomnia              Restlessness               Agitation              CAM / Delirium __ --  ___+   Constipation     __ --  ___+   Diarrhea           __ --  ___+      Physical Exam   Constitutional:   unresposive   Neurological: He is unresponsive.       Significant Labs:   CBC:   Recent Labs   Lab 12/12/18  0541   WBC 7.18   HGB 10.9*   HCT 33.4*   *   PLT 61*     BMP:  Recent Labs   Lab 12/12/18  0541      *   K 4.1   CL 99   CO2 15*   BUN 5*   CREATININE 1.0   CALCIUM 7.3*   MG 2.0     LFT:  Lab Results   Component Value Date    AST 52 (H) 12/11/2018    ALKPHOS 121 12/11/2018    BILITOT 1.6 (H) 12/11/2018     Albumin:   Albumin   Date Value Ref Range Status   12/12/2018 1.8 (L) 3.5 - 5.2 g/dL Final     Protein:   Total Protein   Date Value Ref Range Status   12/11/2018 5.3 (L) 6.0 - 8.4 g/dL Final     Lactic acid:   Lab Results   Component Value Date    LACTATE 1.7 12/10/2018     LACTATE 2.9 (H) 12/09/2018       Significant Imaging: none    Advanced Directives::  Living Will: No  LaPOST: Yes  Do Not Resuscitate Status: Yes  Medical Power of : Yes. Agent's Name: [email or web address]. Agent's Contact Number:     Decision-Making Capacity: Family answered questions, Patient unable to communicate due to disease severity/cognitive impairment    Living Arrangements: Lives with friend

## 2018-12-13 NOTE — PHYSICIAN QUERY
"PT Name: Thee Lane  MR #: [id number]    Physician Query Form - Heart  Condition Clarification     CDS/: Parris Magaña               Contact information: [email or web address]   This form is a permanent document in the medical record.     Query Date: December 13, 2018    By submitting this query, we are merely seeking further clarification of documentation. Please utilize your independent clinical judgment when addressing the question(s) below.    The medical record contains the following   Indicators     Supporting Clinical Findings Location in Medical Record   X BNP BNP 4,248   Lab 12/8   X EF EF 25% IVETH 12/10    Radiology findings     X Echo Results · Severely decreased left ventricular systolic function. The estimated ejection fraction is 25%  · Moderate tricuspid regurgitation.  · Normal right ventricular systolic function.  · Severe left atrial enlargement.  · Severe atrial enlargement.  · Diastolic pattern consistent with atrial fibrillation observed.  · Mild mitral regurgitation.  · The estimated PA systolic pressure is 41.94 mm Hg  · Pulmonary hypertension present. IVETH 12/10    "Ascites" documented     X "SOB" or "PAUL" documented ED c/o of SOB x 1 day Patient was reportedly sating the 60s, brought up to the 80's on transport and improved to the 90's while in the ED.   H&P 12/8    "Hypoxia" documented     X Heart Failure documented  HFrEF: Pt has b/l pleural effusions   Pulmonology Consult 12/9    "Edema" documented     X Diuretics/Meds Lasix ggt but not adequate output   DC Summary     Treatment:      Other:      Heart failure (HF) can be acute, chronic or both. It is generally further specificed as systolic, diastolic, or combined. Lastly, it is important to identify an underlying etiology if known or suspected.     Common clues to acute exacerbation:  Rapidly progressive symptoms (w/in 2 weeks of presentation), using IV diuretics to treat, using supplemental O2, pulmonary edema on Xray, MI " w/in 4 weeks, and/or BNP >500    Systolic Heart Failure: is defined as chart documentation of a left ventricular ejection fraction (LVEF) less than 40%     Diastolic Heart Failure: is defined as a left ventricular ejection fraction (LVEF) greater than 40%   +      Evidence of diastolic dysfunction on echocardiography OR    Right heart catheterization wedge pressure above 12 mm Hg OR    Left heart catheterization left ventricular end diastolic pressure 18 mm Hg or above.    References: *American Heart Association    The clinical guidelines noted below are only system guidelines, and do not replace the providers clinical judgment.     Provider, please specify the diagnosis associated with above clinical findings  [  x ] Acute Systolic Heart Failure - New diagnosis.  EF < 40%  and acute HF symptoms documented    [   ] Acute on Chronic Systolic Heart Failure- Pre-existing systolic HF diagnosis.  EF < 40%  and acute HF symptoms documented    [   ] Other Type of Heart Failure (please specify type): _________________________  _  [  ] Clinically Undetermined                          Please document in your progress notes daily for the duration of treatment until resolved and include in your discharge summary.

## 2018-12-13 NOTE — PHYSICIAN QUERY
PT Name: Thee Lane  MR #: [id number]    Physician Query Form - Atrial Fibrillation Specificity     CDS/: Parris Magaña               Contact information: [email or web address]       This form is a permanent document in the medical record.     Query Date: December 13, 2018    By submitting this query, we are merely seeking further clarification of documentation. Please utilize your independent clinical judgment when addressing the question(s) below.    The medical record contains the following:   Indicators     Supporting Clinical Findings Location in Medical Record   X Atrial Fibrillation Atrial fibrillation with RVR, Afib,   DC Summary    X EKG results Atrial fibrillation with rapid ventricular response EKG 12/8    Medication     X Treatment patient wernt into Afib with RVR and received diltiazem x2. Blood pressure dropped to 80's/50s and patient started on levophed drip. Cards consulted and reccommended Amiodarone drip.    DC summary     Other         Provider, please further specify the Atrial Fibrillation diagnosis.    [  x] Chronic   [  ] Paroxysmal   [  ] Permanent   [  ] Persistent   [  ] Other (please specify):   [  ] Clinically Undetermined       Please document in your progress notes daily for the duration of treatment until resolved, and include in your discharge summary.

## 2018-12-13 NOTE — PROGRESS NOTES
[facility]  Palliative Medicine  Progress Note    Patient Name: Thee Lane  MRN: [id number]  Admission Date: 12/12/2018  Hospital Length of Stay: 1 days  Code Status: DNR   Attending Provider: No att. providers found  Consulting Provider: Minerva Davidson MD  Primary Care Physician: Mak Wilkins MD  Principal Problem:Comfort measures only status    Patient information was obtained from ER records.      Assessment/Plan:     No new Assessment & Plan notes have been filed under this hospital service since the last note was generated.  Service: Palliative Medicine      none    Subjective:     Chief Complaint:   Chief Complaint   Patient presents with    Respiratory Distress       HPI:   Patient is a 68 y.o. M, EToH abuse presented to the ED c/o of SOB x 1 day prior to arrival to the ED.     Patient was reportedly sating the 60s, brought up to the 80's on transport and improved to the 90's while in the ED. Patient reported generalized weakness, coughing, wheezing and back pain x 1 week prior to presentation, however denies any subjective fever, chills, n/v.    On arrival to the ED patient's vitals were /78, P 130, R 34 Temp 99 and Spow 99%. Patient was found to D-dimer +. CTA completed showed B/l pleural effusions with collapse of Left and right lower lobe. While in the ED , patient wernt into Afib with RVR received diltiazem x2. Blood pressure dropped to 80's/50s and patient started on levophed drip. Started Amiodarone drip, received Lasix 80mg x 1. Mg 2g after Mg level low at 0.7,. Ativan x 2 for anxiety.  Patient intubated, started on CRRT but stopped on 12/111 due to hypotension, patient with progressive decline. Family did not want aggressive measure and patient was made DNR  Family decided today to transition to comfort measures. Emotional comfort provided      Patient too unstable for transportation at this time. Requiring inhospital hospice services until stable or symptoms  controlled. Consult hosp[facility] for inpatient service.    Fentanyl gtt infusing. Patient terminally extubated. Ativan prn given.  Called that patient is asystole on the monitor. Patient was pronounced dead 1345 on 12/12/2018.            Hospital Course:  No notes on file    Interval History: Fentanyl gtt infusing. Patient terminally extubated. Ativan prn given. Family by bedside.  Called that patient is asystole on the monitor. Patient was pronounced dead 1345 on 12/12/2018.    Medications:  Continuous Infusions:   fentanyl 100 mcg/hr (12/12/18 1230)     Scheduled Meds:   lorazepam  2 mg Intravenous Q2H     PRN Meds:    Objective:     Vital Signs (Most Recent):  Pulse: (!) 0 (12/12/18 1345)  Resp: (!) 0 (12/12/18 1345)  BP: (!) 62/46 (12/12/18 1300)  SpO2: 98 % (12/12/18 1330) Vital Signs (24h Range):  Temp:  [97 °F (36.1 °C)-102.6 °F (39.2 °C)] 102.6 °F (39.2 °C)  Pulse:  [0-149] 0  Resp:  [0-36] 0  SpO2:  [70 %-98 %] 98 %  BP: (45-81)/(23-57) 62/46  Arterial Line BP: (8-86)/(8-59) 8/8        There is no height or weight on file to calculate BMI.    Review of Symptoms  Symptom Assessment (ESAS 0-10 scale)  ESAS 0 1 2 3 4 5 6 7 8 9 10   Pain              Dyspnea              Anxiety              Nausea              Depression               Anorexia              Fatigue              Insomnia              Restlessness               Agitation              CAM / Delirium __ --  ___+   Constipation     __ --  ___+   Diarrhea           __ --  ___+      Physical Exam   Constitutional:   unresposive   Neurological: He is unresponsive.       Significant Labs:   CBC:   Recent Labs   Lab 12/12/18  0541   WBC 7.18   HGB 10.9*   HCT 33.4*   *   PLT 61*     BMP:  Recent Labs   Lab 12/12/18  0541      *   K 4.1   CL 99   CO2 15*   BUN 5*   CREATININE 1.0   CALCIUM 7.3*   MG 2.0     LFT:  Lab Results   Component Value Date    AST 52 (H) 12/11/2018    ALKPHOS 121 12/11/2018    BILITOT 1.6 (H) 12/11/2018      Albumin:   Albumin   Date Value Ref Range Status   12/12/2018 1.8 (L) 3.5 - 5.2 g/dL Final     Protein:   Total Protein   Date Value Ref Range Status   12/11/2018 5.3 (L) 6.0 - 8.4 g/dL Final     Lactic acid:   Lab Results   Component Value Date    LACTATE 1.7 12/10/2018    LACTATE 2.9 (H) 12/09/2018       Significant Imaging: none    Advanced Directives::  Living Will: No  LaPOST: Yes  Do Not Resuscitate Status: Yes  Medical Power of : Yes. Agent's Name: [email or web address]. Agent's Contact Number:     Decision-Making Capacity: Family answered questions, Patient unable to communicate due to disease severity/cognitive impairment    Living Arrangements: Lives with friend      > 50% of 35 min visit spent in face to face discussion with family and emotional support.    Minerva Davidson MD  Palliative Medicine  [facility]

## 2018-12-14 LAB
BACTERIA BLD CULT: NORMAL

## 2018-12-14 NOTE — PLAN OF CARE
"  Thee Lane was identified as being in soft 2 point restraints within 24 hours of expiration.  This patient has been entered in the Restraint Mortality Log on [ 12.12.2018 1837]."    Esteban Garcia RN  "

## 2018-12-14 NOTE — PROGRESS NOTES
[facility]  Palliative Medicine  Discharge Summary  Patient Name: Thee Lane  MRN: [id number]  Admission Date: 12/12/2018  Hospital Length of Stay: 1 days  Code Status: Prior   Attending Provider: No att. providers found  Consulting Provider: Minerva Davidson MD  Primary Care Physician: Mak Wilkins MD  Principal Problem:Comfort measures only status    Patient information was obtained from ER records.      Assessment/Plan:     No new Assessment & Plan notes have been filed under this hospital service since the last note was generated.  Service: Palliative Medicine      none    Subjective:     Chief Complaint:   Chief Complaint   Patient presents with    Respiratory Distress       HPI:   Patient is a 68 y.o. M, EToH abuse presented to the ED c/o of SOB x 1 day prior to arrival to the ED.     Patient was reportedly sating the 60s, brought up to the 80's on transport and improved to the 90's while in the ED. Patient reported generalized weakness, coughing, wheezing and back pain x 1 week prior to presentation, however denies any subjective fever, chills, n/v.    On arrival to the ED patient's vitals were /78, P 130, R 34 Temp 99 and Spow 99%. Patient was found to D-dimer +. CTA completed showed B/l pleural effusions with collapse of Left and right lower lobe. While in the ED , patient wernt into Afib with RVR received diltiazem x2. Blood pressure dropped to 80's/50s and patient started on levophed drip. Started Amiodarone drip, received Lasix 80mg x 1. Mg 2g after Mg level low at 0.7,. Ativan x 2 for anxiety.  Patient intubated, started on CRRT but stopped on 12/111 due to hypotension, patient with progressive decline. Family did not want aggressive measure and patient was made DNR  Family decided today to transition to comfort measures. Emotional comfort provided      Patient too unstable for transportation at this time. Requiring inhospital hospice services until stable or  symptoms controlled. Consult [facility] for inpatient service.    Fentanyl gtt infusing. Patient terminally extubated. Ativan prn given.  Called that patient is asystole on the monitor. Patient was pronounced dead 1345 on 12/12/2018.            Hospital Course:  No notes on file    No new subjective & objective note has been filed under this hospital service since the last note was generated.      > 50% of 34 min visit spent in chart review,  coordination of care and emotional support.    Minerva Davidson MD  Palliative Medicine  [facility]

## 2018-12-14 NOTE — DISCHARGE SUMMARY
[facility]  Palliative Medicine  Discharge Summary  Patient Name: Thee Lane  MRN: [id number]  Admission Date: 12/12/2018  Hospital Length of Stay: 1 days  Code Status: Prior   Attending Provider: No att. providers found  Consulting Provider: Minerva Davidson MD  Primary Care Physician: Mak Wilkins MD  Principal Problem:Comfort measures only status     Patient information was obtained from ER records.       Assessment/Plan:      No new Assessment & Plan notes have been filed under this hospital service since the last note was generated.  Service: Palliative Medicine        none     Subjective:      Chief Complaint:       Chief Complaint   Patient presents with    Respiratory Distress         HPI:   Patient is a 68 y.o. M, EToH abuse presented to the ED c/o of SOB x 1 day prior to arrival to the ED.      Patient was reportedly sating the 60s, brought up to the 80's on transport and improved to the 90's while in the ED. Patient reported generalized weakness, coughing, wheezing and back pain x 1 week prior to presentation, however denies any subjective fever, chills, n/v.    On arrival to the ED patient's vitals were /78, P 130, R 34 Temp 99 and Spow 99%. Patient was found to D-dimer +. CTA completed showed B/l pleural effusions with collapse of Left and right lower lobe. While in the ED , patient wernt into Afib with RVR received diltiazem x2. Blood pressure dropped to 80's/50s and patient started on levophed drip. Started Amiodarone drip, received Lasix 80mg x 1. Mg 2g after Mg level low at 0.7,. Ativan x 2 for anxiety.  Patient intubated, started on CRRT but stopped on 12/111 due to hypotension, patient with progressive decline. Family did not want aggressive measure and patient was made DNR  Family decided today to transition to comfort measures. Emotional comfort provided        Patient too unstable for transportation at this time. Requiring inhospital hospice services until  stable or symptoms controlled. Consult hosp[facility] for inpatient service.     Fentanyl gtt infusing. Patient terminally extubated. Ativan prn given.  Called that patient is asystole on the monitor. Patient was pronounced dead 1345 on 12/12/2018.                 Hospital Course:  No notes on file     No new subjective & objective note has been filed under this hospital service since the last note was generated.        > 50% of 34 min visit spent in chart review,  coordination of care and emotional support.     Minerva Davidson MD  Palliative Medicine  [facility]

## 2018-12-16 LAB
BACTERIA BLD CULT: NORMAL

## 2019-01-08 LAB — FUNGUS SPEC CULT: NORMAL

## 2019-02-10 LAB
ACID FAST MOD KINY STN SPEC: NORMAL
MYCOBACTERIUM SPEC QL CULT: NORMAL
